# Patient Record
Sex: FEMALE | Race: OTHER | HISPANIC OR LATINO | URBAN - METROPOLITAN AREA
[De-identification: names, ages, dates, MRNs, and addresses within clinical notes are randomized per-mention and may not be internally consistent; named-entity substitution may affect disease eponyms.]

---

## 2023-03-01 ENCOUNTER — INPATIENT (INPATIENT)
Facility: HOSPITAL | Age: 88
LOS: 1 days | Discharge: HOME | DRG: 149 | End: 2023-03-03
Attending: STUDENT IN AN ORGANIZED HEALTH CARE EDUCATION/TRAINING PROGRAM | Admitting: STUDENT IN AN ORGANIZED HEALTH CARE EDUCATION/TRAINING PROGRAM
Payer: COMMERCIAL

## 2023-03-01 VITALS
HEIGHT: 60.24 IN | WEIGHT: 138.89 LBS | HEART RATE: 64 BPM | OXYGEN SATURATION: 98 % | DIASTOLIC BLOOD PRESSURE: 69 MMHG | SYSTOLIC BLOOD PRESSURE: 195 MMHG | RESPIRATION RATE: 16 BRPM | TEMPERATURE: 97 F

## 2023-03-01 DIAGNOSIS — I16.0 HYPERTENSIVE URGENCY: ICD-10-CM

## 2023-03-01 LAB
ACETONE SERPL-MCNC: NEGATIVE — SIGNIFICANT CHANGE UP
ALBUMIN SERPL ELPH-MCNC: 3.4 G/DL — LOW (ref 3.5–5)
ALP SERPL-CCNC: 70 U/L — SIGNIFICANT CHANGE UP (ref 40–120)
ALT FLD-CCNC: 18 U/L DA — SIGNIFICANT CHANGE UP (ref 10–60)
ANION GAP SERPL CALC-SCNC: 5 MMOL/L — SIGNIFICANT CHANGE UP (ref 5–17)
APTT BLD: 37.4 SEC — HIGH (ref 27.5–35.5)
AST SERPL-CCNC: 25 U/L — SIGNIFICANT CHANGE UP (ref 10–40)
BASOPHILS # BLD AUTO: 0.05 K/UL — SIGNIFICANT CHANGE UP (ref 0–0.2)
BASOPHILS NFR BLD AUTO: 1 % — SIGNIFICANT CHANGE UP (ref 0–2)
BILIRUB SERPL-MCNC: 0.6 MG/DL — SIGNIFICANT CHANGE UP (ref 0.2–1.2)
BUN SERPL-MCNC: 36 MG/DL — HIGH (ref 7–18)
CALCIUM SERPL-MCNC: 10.1 MG/DL — SIGNIFICANT CHANGE UP (ref 8.4–10.5)
CHLORIDE SERPL-SCNC: 109 MMOL/L — HIGH (ref 96–108)
CK SERPL-CCNC: 150 U/L — SIGNIFICANT CHANGE UP (ref 21–215)
CO2 SERPL-SCNC: 25 MMOL/L — SIGNIFICANT CHANGE UP (ref 22–31)
CREAT SERPL-MCNC: 1.49 MG/DL — HIGH (ref 0.5–1.3)
EGFR: 32 ML/MIN/1.73M2 — LOW
EOSINOPHIL # BLD AUTO: 0.03 K/UL — SIGNIFICANT CHANGE UP (ref 0–0.5)
EOSINOPHIL NFR BLD AUTO: 0.6 % — SIGNIFICANT CHANGE UP (ref 0–6)
GLUCOSE SERPL-MCNC: 137 MG/DL — HIGH (ref 70–99)
HCT VFR BLD CALC: 33.5 % — LOW (ref 34.5–45)
HGB BLD-MCNC: 11.2 G/DL — LOW (ref 11.5–15.5)
IMM GRANULOCYTES NFR BLD AUTO: 0.4 % — SIGNIFICANT CHANGE UP (ref 0–0.9)
INR BLD: 1.19 RATIO — HIGH (ref 0.88–1.16)
LYMPHOCYTES # BLD AUTO: 0.79 K/UL — LOW (ref 1–3.3)
LYMPHOCYTES # BLD AUTO: 16 % — SIGNIFICANT CHANGE UP (ref 13–44)
MAGNESIUM SERPL-MCNC: 1.8 MG/DL — SIGNIFICANT CHANGE UP (ref 1.6–2.6)
MCHC RBC-ENTMCNC: 30.1 PG — SIGNIFICANT CHANGE UP (ref 27–34)
MCHC RBC-ENTMCNC: 33.4 GM/DL — SIGNIFICANT CHANGE UP (ref 32–36)
MCV RBC AUTO: 90.1 FL — SIGNIFICANT CHANGE UP (ref 80–100)
MONOCYTES # BLD AUTO: 0.28 K/UL — SIGNIFICANT CHANGE UP (ref 0–0.9)
MONOCYTES NFR BLD AUTO: 5.7 % — SIGNIFICANT CHANGE UP (ref 2–14)
NEUTROPHILS # BLD AUTO: 3.78 K/UL — SIGNIFICANT CHANGE UP (ref 1.8–7.4)
NEUTROPHILS NFR BLD AUTO: 76.3 % — SIGNIFICANT CHANGE UP (ref 43–77)
NRBC # BLD: 0 /100 WBCS — SIGNIFICANT CHANGE UP (ref 0–0)
NT-PROBNP SERPL-SCNC: 943 PG/ML — HIGH (ref 0–450)
PLATELET # BLD AUTO: 145 K/UL — LOW (ref 150–400)
POTASSIUM SERPL-MCNC: 4.1 MMOL/L — SIGNIFICANT CHANGE UP (ref 3.5–5.3)
POTASSIUM SERPL-SCNC: 4.1 MMOL/L — SIGNIFICANT CHANGE UP (ref 3.5–5.3)
PROT SERPL-MCNC: 7.2 G/DL — SIGNIFICANT CHANGE UP (ref 6–8.3)
PROTHROM AB SERPL-ACNC: 14.2 SEC — HIGH (ref 10.5–13.4)
RBC # BLD: 3.72 M/UL — LOW (ref 3.8–5.2)
RBC # FLD: 12.8 % — SIGNIFICANT CHANGE UP (ref 10.3–14.5)
SODIUM SERPL-SCNC: 139 MMOL/L — SIGNIFICANT CHANGE UP (ref 135–145)
TROPONIN I, HIGH SENSITIVITY RESULT: 14.3 NG/L — SIGNIFICANT CHANGE UP
WBC # BLD: 4.95 K/UL — SIGNIFICANT CHANGE UP (ref 3.8–10.5)
WBC # FLD AUTO: 4.95 K/UL — SIGNIFICANT CHANGE UP (ref 3.8–10.5)

## 2023-03-01 PROCEDURE — 70450 CT HEAD/BRAIN W/O DYE: CPT | Mod: 26,MA

## 2023-03-01 PROCEDURE — 99285 EMERGENCY DEPT VISIT HI MDM: CPT

## 2023-03-01 RX ORDER — LABETALOL HCL 100 MG
100 TABLET ORAL ONCE
Refills: 0 | Status: COMPLETED | OUTPATIENT
Start: 2023-03-01 | End: 2023-03-01

## 2023-03-01 RX ORDER — SODIUM CHLORIDE 9 MG/ML
500 INJECTION INTRAMUSCULAR; INTRAVENOUS; SUBCUTANEOUS ONCE
Refills: 0 | Status: COMPLETED | OUTPATIENT
Start: 2023-03-01 | End: 2023-03-01

## 2023-03-01 RX ORDER — SODIUM CHLORIDE 9 MG/ML
1000 INJECTION INTRAMUSCULAR; INTRAVENOUS; SUBCUTANEOUS
Refills: 0 | Status: DISCONTINUED | OUTPATIENT
Start: 2023-03-01 | End: 2023-03-02

## 2023-03-01 RX ORDER — HYDRALAZINE HCL 50 MG
10 TABLET ORAL ONCE
Refills: 0 | Status: COMPLETED | OUTPATIENT
Start: 2023-03-01 | End: 2023-03-01

## 2023-03-01 RX ORDER — ASPIRIN/CALCIUM CARB/MAGNESIUM 324 MG
162 TABLET ORAL ONCE
Refills: 0 | Status: COMPLETED | OUTPATIENT
Start: 2023-03-01 | End: 2023-03-01

## 2023-03-01 RX ADMIN — SODIUM CHLORIDE 125 MILLILITER(S): 9 INJECTION INTRAMUSCULAR; INTRAVENOUS; SUBCUTANEOUS at 17:55

## 2023-03-01 RX ADMIN — Medication 100 MILLIGRAM(S): at 22:25

## 2023-03-01 RX ADMIN — Medication 10 MILLIGRAM(S): at 22:25

## 2023-03-01 RX ADMIN — SODIUM CHLORIDE 1000 MILLILITER(S): 9 INJECTION INTRAMUSCULAR; INTRAVENOUS; SUBCUTANEOUS at 23:40

## 2023-03-01 NOTE — ED PROVIDER NOTE - PATIENT PORTAL LINK FT
You can access the FollowMyHealth Patient Portal offered by [facility] by registering at the following website: [email or web address]. By joining [facility]’s FollowMyHealth portal, you will also be able to view your health information using other applications (apps) compatible with our system.

## 2023-03-01 NOTE — ED PROVIDER NOTE - PROGRESS NOTE DETAILS
lying down  HR 62, B/P 182/75  sitting up    HR 65, B/P 179/71  standing up HR 66, B/P 174/71 pt's feeling much better, Labs/CT head explained to pt & daughter in law, Pt with JANINE, will d/c home.  Advised to f/u with PCP Case d/w Dr. Hidalgo B/P 179/77, pt still c/o feeling dizzy, will give Hydralazine & reassess  B/P 111/71, pt's c/o leg shaking, not feeling better, will admit pt.

## 2023-03-01 NOTE — ED PROVIDER NOTE - NEUROLOGICAL, MLM
Alert and oriented, no focal deficits, no motor or sensory deficits.  Romberg- Alert and oriented, no focal deficits, no motor or sensory deficits.  Romberg-neg, no ataxia with roll aider

## 2023-03-01 NOTE — ED PROVIDER NOTE - NSFOLLOWUPINSTRUCTIONS_ED_ALL_ED_FT
Mareos    Dizziness      Los mareos son un problema muy frecuente. Se trata de aba sensación de inestabilidad o desvanecimiento. Puede sentir que se va a desmayar. Los mareos pueden provocarle aba lesión si se tropieza o se . Las personas de todas las edades pueden sufrir mareos, laron es más frecuente en los adultos mayores. Esta afección puede tener muchas causas, entre las que se pueden mencionar los medicamentos, la deshidratación y las enfermedades.      Siga estas instrucciones en benedict casa:      Comida y bebida   A comparison of three sample cups showing dark yellow, yellow, and pale yellow urine.   •Agnieszka suficiente líquido sophie para mantener la orina de color amarillo pálido. [place]agon [place] kota la deshidratación. Trate de beber más líquidos transparentes, sophie agua.      • No agnieszka alcohol.      •Limite el consumo de cafeína si el médico se lo indica. Verifique los ingredientes y la información nutricional para saber si un alimento o aba bebida contienen cafeína.      •Limite el consumo de sal (sodio) si el médico se lo indica. Verifique los ingredientes y la información nutricional para saber si un alimento o aba bebida contienen sodio.        Actividad   A sign showing that a person should not drive. •Evite los movimientos rápidos.  •Levántese de las minh con lentitud y apóyese hasta sentirse ariana.      •Por la mañana, siéntese lee a un lado de la cama. Cuando se sienta ariana, póngase lentamente de pie mientras se sostiene de algo, hasta que sepa que ha logrado el equilibrio.        •Mueva las piernas con frecuencia si debe estar de pie en un lugar garfield mucho tiempo. Mientras esté de pie, contraiga y relaje los músculos de las piernas.      • No conduzca vehículos ni opere maquinaria si se siente mareado.      •Evite agacharse si se siente mareado. En benedict casa, coloque los objetos de modo que le resulte fácil alcanzarlos sin agacharse.      Estilo de angelic     • No consuma ningún producto que contenga nicotina o tabaco. Estos productos incluyen cigarrillos, tabaco para mascar y aparatos de vapeo, sophie los cigarrillos electrónicos. Si necesita ayuda para dejar de fumar, consulte al médico.      •Trate de reducir el nivel de estrés con métodos sophie el yoga o la meditación. Hable con el médico si necesita ayuda para controlar el nivel de estrés.      Instrucciones generales     •Controle zahida mareos para eric si hay cambios.      •Use los medicamentos de venta hussein y los recetados solamente sophie se lo haya indicado el médico. Hable con el médico si messi que los medicamentos que está tomando son la causa de zahida mareos.      •Infórmele a un amigo o a un familiar si se siente mareado. Pídale a esta persona que llame al médico si observa cambios en benedict comportamiento.      •Concurra a todas las visitas de seguimiento. [place] es importante.        Comuníquese con un médico si:    •Los mareos no desaparecen o tiene síntomas nuevos.      •Los mareos o la sensación de desvanecimiento empeoran.      •Siente náuseas.      •Se le redujo la audición.      •Tiene fiebre.      •Dolor o rigidez en el zuleyma.      •Los mareos derivan en aba lesión o aba caída.        Solicite ayuda de inmediato si:    •Vomita o tiene diarrea y no puede comer ni beber nada.      •Tiene dificultad para hablar, caminar, tragar o usar los brazos, las tracie o las piernas.      •Se siente constantemente débil.      •Tiene cualquier tipo de sangrado.      •No piensa con claridad o tiene dificultad para armar oraciones. Es posible que un amigo o un familiar adviertan que esto ocurre.      •Tiene dolor de pecho, dolor abdominal, sudoración o le falta el aire.      •Tiene cambios en la visión o le aparece un dolor de kaylah intenso.      Estos síntomas pueden representar un problema grave que constituye aba emergencia. No espere a eric si los síntomas desaparecen. Solicite atención médica de inmediato. Comuníquese con el servicio de emergencias de benedict localidad (911 en los [place]). No conduzca por zahida propios medios hasta el hospital.       Resumen    •Los mareos son aba sensación de inestabilidad o desvanecimiento. Esta afección puede tener muchas causas, entre las que se pueden mencionar los medicamentos, la deshidratación y las enfermedades.      •Las personas de todas las edades pueden sufrir mareos, laron es más frecuente en los adultos mayores.      •Agnieszka suficiente líquido sophie para mantener la orina de color amarillo pálido. No agnieszka alcohol.      •Evite los movimientos rápidos si se siente mareado. Controle zahida mareos para eric si hay cambios.      Esta información no tiene sophie fin reemplazar el consejo del médico. Asegúrese de hacerle al médico cualquier pregunta que tenga.            Lesión renal aguda    LO QUE NECESITA SABER:    ¿Qué es aba lesión renal aguda?La lesión renal aguda (JANINE) también se llama insuficiencia renal aguda o insuficiencia de riñón aguda. La JANINE ocurre cuando los riñones de repente rachel de funcionar correctamente. Generalmente, los riñones se encargan de eliminar líquidos, químicos y desperdicios de la beatris. Los riñones convierten estos desechos en orina. La JANINE ocurre generalmente garfield horas o días. Cuando tiene JANINE, los riñones no eliminan los residuos, químicos o líquidos adicionales del cuerpo. No se produce la cantidad normal de orina. Aba JANINE en la mayoría de los casos es temporal, laron es posible que se convierta en un trastorno renal crónico.    ¿Qué causa JANINE?  •Disminución del flujo sanguíneo al riñón, sophie por hipercalcemia (nivel de calcio elevado en la beatris) o enfermedad grave de corazón      •Aba enfermedad o afección que afecta a los riñones, sophie hipertensión (presión trudi de beatris) o diabetes      •Aba obstrucción en el riñón o el uréter, sophie aba faye de riñón o vejiga, agrandamiento de la próstata o tumor      ¿Qué aumenta mi riesgo de padecer JANINE?  •Ser hospitalizado con aba enfermedad grave, bertha sophie sepsis o quemaduras graves      •Enfermedad arterial periférica      •Edad avanzada en adultos      •Problemas de riñón o hígado      •Afecciones médicas tales sophie deshidratación, hipertensión, diabetes o insuficiencia cardíaca      •Ciertos medicamentos sophie los TRACI      ¿Cuáles son los signos y síntomas de la JANINE?Puede que no tenga ningún síntoma si tiene JANINE inicial o leve. A medida que progresa la JANINE, puede que sufra alguno de los siguientes:   •Disminución en la cantidad de orina o ninguna micción      •Inflamación en los brazos, piernas o pies      •Debilidad, somnolencia o falta de apetito      •Náuseas, dolor en el costado, contorsiones musculares o calambres musculares      •Picazón en la piel, o el aliento o el cuerpo huelen a orina      •Cambios de comportamiento, confusión, desorientación o convulsiones      ¿Cómo se diagnostica la JANINE?Hay muchas causas de la JANINE. Para encontrar la causa y tratar correctamente benedict JANINE, benedict médico puede hacer cualquiera de las siguientes:   •Exámenes de beatris y orinamuestran lo ariana que funcionan zahida riñones. También puede mostrar la causa de benedict JANINE.      •Aba radiografía o un ultrasonidopuede mostrar si hay problemas en los riñones. Benedict médico puede eric aba obstrucción en los riñones. Puede eric el estrechamiento de la arteria que envía beatris a los riñones. Es probable que usted reciba un líquido de contraste para que zahida riñones se aprecien mejor en las imágenes. Dígale al médico si usted alguna vez ha tenido aba reacción alérgica al líquido de contraste.      ¿Cómo se trata la JANINE?El tratamiento depende de la causa de benedict lesión renal aguda y la gravedad de la misma. Generalmente, se monitoreará la JANINE en el hospital. Si tiene JANINE leve, quizás pueda volver a benedict casa para recuperarse. Benedict médico tratará la causa de benedict JANINE. Puede necesitar líquidos intravenosos si el JANINE fue causado por la escasez o ausencia de líquido en el cuerpo. Puede que necesite diálisis para extraer el líquido adicional y los desechos de benedict cuerpo. Benedict médico podría indicarle que consuma alimentos bajos en sodio (sal), potasio, fósforo o proteína. Puede que necesite eric a un dietista antes de que sea dado de trudi para obtener ayuda con la planificación de zahida comidas.    ¿Cómo puedo prevenir la JANINE?  •Controle otras afecciones de saludcomo la diabetes, hipertensión o enfermedades cardíacas. Estos trastornos aumentan benedict riesgo de lesión renal aguda. [place] el medicamento para benedict afección sophie se le indique. Revise benedict presión arterial y niveles de azúcar en la beatris sophie se le indicó. Póngase en contacto con benedict médico si zahida niveles no están en el rango que le dijo que debe ser.      •Hable con benedict médico antes de shima medicamentos sin receta médica.Los medicamentos antiinflamatorios no esteroides o AINES, los medicamentos para el estómago o los laxantes podrían ser nocivos para zahida riñones y aumentar el riesgo de aba lesión renal aguda. Si está ariana shima el medicamento, siga las instrucciones en el paquete. No tome más medicamento de lo indicado.      •Diga a los médicos si usted ha tenido JANINE anteriormenteantes de recibir un líquido de contraste para radiografías o aba tomografía computarizada. Benedict médico podría darle medicamento para prevenir problemas renales causados por el líquido.      ACUERDOS SOBRE BENEDICT CUIDADO:    Usted tiene el derecho de ayudar a planear benedict cuidado. Aprenda todo lo que pueda sobre benedict condición y sophie darle tratamiento. Discuta zahida opciones de tratamiento con zahida médicos para decidir el cuidado que usted desea recibir. Usted siempre tiene el derecho de rechazar el tratamiento.       low salt diet        Cómo controlar benedict hipertensión    Managing Your Hypertension      La hipertensión, también conocida sophie presión arterial trudi, se produce cuando la beatris ejerce presión contra las fallon de las arterias con demasiada fuerza. Las arterias son los vasos sanguíneos que transportan la beatris desde el corazón hacia todas las partes del cuerpo. La hipertensión hace que el corazón chucky más esfuerzo para bombear la beatris y puede provocar que las arterias se estrechen o endurezcan.      Qué significan las lecturas de la presión arterial    Aba lectura de la presión arterial consta de un número más alto sobre un número más bajo.  •El primer número, o número superior, es la presión sistólica. Es la medida de la presión de las arterias cuando el corazón late.      •El dayday número, o número inferior, es la presión diastólica. Es la medida de la presión en las arterias cuando el corazón se relaja.      Para la mayoría de las personas, aba presión arterial normal está por debajo de 120/80. La presión arterial deseada puede variar en función de las enfermedades, la edad y otros factores personales.    La presión arterial se clasifica en cuatro etapas. Sobre la base de la lectura de benedict presión arterial, el médico puede usar las siguientes etapas para determinar si necesita tratamiento y de qué tipo. La presión sistólica y la presión diastólica se miden en aba unidad llamada milímetros de jenni (mm Hg).    Normal    •Presión sistólica: por debajo de 120.      •Presión diastólica: por debajo de 80.      Elevada    •Presión sistólica: 120–129.      •Presión diastólica: por debajo de 80.      Etapa 1 de hipertensión    •Presión sistólica: 130–139.      •Presión diastólica: 80–89.      Etapa 2 de hipertensión    •Presión sistólica: 140 o más.      •Presión diastólica: 90 o más.        ¿Cómo puede afectarme esta enfermedad?    Controlar la hipertensión es muy importante. Con el transcurso del tiempo, la hipertensión puede dañar las arterias y disminuir el flujo de beatris hacia partes del cuerpo que incluyen el cerebro, el corazón y los riñones. Tener hipertensión no tratada o no controlada puede causar:  •Infarto de miocardio.      •Accidente cerebrovascular.      •Debilitamiento de los vasos sanguíneos (aneurisma).      •Insuficiencia cardíaca.      •Daño renal.      •Daño ocular.      •Problemas de memoria y concentración.      •Demencia vascular.        ¿Qué medidas puedo shima para controlar esta afección?    La hipertensión se puede controlar haciendo cambios en el estilo de angelic y, posiblemente, tomando medicamentos. Benedict médico le ayudará a crear un plan para bajar la presión arterial al rango normal. Es posible que lo deriven para que reciba asesoramiento sobre aba dieta saludable y actividad física.      Nutrición   A plate with examples of foods in a healthy diet. •Siga aba dieta con alto contenido de fibras y potasio, y con bajo contenido de sal (sodio), azúcar agregada y grasas. Un ejemplo de plan de alimentación se denomina dieta DASH. DASH es la sigla en inglés de “Enfoques alimentarios para detener la hipertensión”. Para alimentarse de esta manera:  •Coma mucha fruta y verdura fresca. Trate de que la mitad del plato de cada comida sea de frutas y verduras.      •Coma cereales integrales, sophie pasta integral, arroz integral o pan integral. Llene aproximadamente un cuarto del plato con cereales integrales.      •Consuma productos lácteos descremados.      •Evite la ingesta de whyte de carne grasa, carne procesada o curada, y carne de ave con piel. Llene aproximadamente un cuarto del plato con proteínas magras, sophie pescado, claudette sin piel, frijoles, huevos o tofu.      •Evite ingerir alimentos prehechos y procesados. En general, estos tienen mayor cantidad de sodio, azúcar agregada y grasa.        •Reduzca benedict ingesta diaria de sodio. Muchas personas que tienen hipertensión deben comer menos de 1500 mg de sodio por día.        Estilo de angelic   A person riding a bicycle wearing a safety helmet.   •Trabaje con benedict médico para mantener un peso saludable o perder peso. Pregúntele cuál es el peso recomendado para usted.      •Realice al menos 30 minutos de ejercicio que chucky que se acelere benedict corazón (ejercicio aeróbico) la mayoría de los días de la semana. Estas actividades pueden incluir caminar, nadar o andar en bicicleta.      •Incluya ejercicios para fortalecer zahida músculos (ejercicios de resistencia), sophie levantamiento de pesas, sophie parte de benedict rutina semanal de ejercicios. Intente realizar 30 minutos de cecy tipo de ejercicios al menos alla días a la semana.      • No consuma ningún producto que contenga nicotina o tabaco. Estos productos incluyen cigarrillos, tabaco para mascar y aparatos de vapeo, sophie los cigarrillos electrónicos. Si necesita ayuda para dejar de consumir estos productos, consulte al médico.      •Controle las enfermedades a jennifer plazo (crónicas), sophie el colesterol alto o la diabetes.      •Identifique zahida causas de estrés y encuentre maneras de controlar el estrés. [place] puede incluir meditación, respiración profunda o hacerse tiempo para realizar actividades divertidas.      Consumo de alcohol   • No agnieszka alcohol si:  •Benedict médico le indica no hacerlo.      •Está embarazada, puede estar embarazada o está tratando de quedar embarazada.      •Si precious alcohol:•Limite la cantidad que precious a lo siguiente:  •De 0 a 1 medida por día para las mujeres.      •De 0 a 2 medidas por día para los hombres.        •Sepa cuánta cantidad de alcohol hay en las bebidas que kerry. En los [place], aba medida equivale a aba botella de cerveza de 12 oz (355 ml), un vaso de vino de 5 oz (148 ml) o un vaso de aba bebida alcohólica de trudi graduación de 1½ oz (44 ml).        Medicamentos     El médico puede recetarle medicamentos si los cambios en el estilo de angelic no son suficientes para lograr controlar la presión arterial y si:  •Benedict presión arterial sistólica es de 130 o más.      •Benedict presión arterial diastólica es de 80 o más.      Use los medicamentos solamente sophie se lo haya indicado el médico. Siga cuidadosamente las indicaciones. Los medicamentos para la presión arterial deben tomarse sophie se lo haya indicado el médico. Los medicamentos pierden eficacia al omitir las dosis. El hecho de omitir las dosis también aumenta el riesgo de otros problemas.      Monitoreo  A person checking his or her blood pressure.   Antes de controlarse la presión arterial:  •No fume, no consuma bebidas con cafeína ni chucky ejercicio dentro de los 30 minutos antes de shima la medición.      •Vaya al baño y vacíe la vejiga (orine).      •Permanezca sentado tranquilamente garfield al menos 5 minutos antes de shima las mediciones.      Contrólese la presión arterial en benedict casa según las indicaciones del médico. Para hacer esto:  •Siéntese con la espalda recta y con apoyo.      •Coloque los pies planos en el piso. No se cruce de piernas.      •Apoye el brazo sobre aba superficie plana, sophie aba carrero. Asegúrese de que la parte superior del brazo esté al nivel del corazón.      •Cada vez que tome aba medición, tome dos o alla lecturas con un minuto de separación y anote los resultados.      Posiblemente también sea necesario que el médico le controle la presión arterial de manera regular.    Información general    •Hable con benedict médico acerca de la dieta, hábitos de ejercicio y otros factores del estilo de angelic que pueden contribuir a la hipertensión.      •Revise con benedict médico todos los medicamentos que kerry ya que puede kirit efectos secundarios o interacciones.      •Concurra a todas las visitas de seguimiento. El médico puede ayudarle a crear y ajustar benedict plan para controlar la presión arterial trudi.        Dónde obtener más información    •National Heart, Lung, and Blood [place] (Instituto Nacional del Coraz[place], los Pulmones y la Beatris): www.nhlbi.nih.gov      •American Heart Association (Asociación Estadounidense del Coraz[place]): www.heart.org        Comuníquese con un médico si:    •Piensa que tiene aba reacción alérgica a los medicamentos que ha tomado.      •Tiene gertrude de kaylah frecuentes (recurrentes).      •Siente mareos.      •Tiene hinchazón en los tobillos.      •Tiene problemas de visión.        Solicite ayuda de inmediato si:    •Siente un dolor de kaylah intenso o confusión.      •Siente debilidad inusual, adormecimiento o que se desmayará.      •Siente un dolor intenso en el pecho o el abdomen.      •Vomita repetidas veces.      •Tiene dificultad para respirar.      Estos síntomas pueden indicar aba emergencia. Solicite ayuda de inmediato. Llame al 911.   • No espere a eric si los síntomas desaparecen.       • No conduzca por zahida propios medios hasta el hospital.         Resumen    •La hipertensión se produce cuando la beatris bombea en las arterias con mucha fuerza. Si esta afección no se controla, podría correr riesgo de tener complicaciones graves.      •La presión arterial deseada puede variar en función de las enfermedades, la edad y otros factores personales. Para la mayoría de las personas, aba presión arterial normal es uriah que 120/80.      •La hipertensión se puede controlar mediante cambios en el estilo de angelic, tomando medicamentos, o ambas cosas.      •Los cambios en el estilo de angelic para ayudar a controlar la hipertensión incluyen pérdida de peso, seguir aba dieta saludable, con bajo contenido de sodio, hacer más ejercicio, dejar de fumar y limitar el consumo de alcohol.      Esta información no tiene sophie fin reemplazar el consejo del médico. Asegúrese de hacerle al médico cualquier pregunta que tenga.

## 2023-03-01 NOTE — ED PROVIDER NOTE - CLINICAL SUMMARY MEDICAL DECISION MAKING FREE TEXT BOX
pt c/o feeling dizzy, no vertigo, will get orthostatic, labs., CT head.  Since pt's on HCTZ, concern for metabolic imbalance.

## 2023-03-01 NOTE — ED PROVIDER NOTE - CARE PLAN
Principal Discharge DX:	Dizziness  Secondary Diagnosis:	JANINE (acute kidney injury)   1 Principal Discharge DX:	Hypertensive urgency  Secondary Diagnosis:	JANINE (acute kidney injury)  Secondary Diagnosis:	Dizziness

## 2023-03-01 NOTE — ED PROVIDER NOTE - OBJECTIVE STATEMENT
89 y.o. female as per daughter, pt c/o feeling dizzy since waking up this am, no vertigo, LOC, CP, n/v, fever.  Pt went to  & sent to ED.

## 2023-03-02 DIAGNOSIS — I16.1 HYPERTENSIVE EMERGENCY: ICD-10-CM

## 2023-03-02 DIAGNOSIS — D32.9 BENIGN NEOPLASM OF MENINGES, UNSPECIFIED: ICD-10-CM

## 2023-03-02 DIAGNOSIS — E11.9 TYPE 2 DIABETES MELLITUS WITHOUT COMPLICATIONS: ICD-10-CM

## 2023-03-02 DIAGNOSIS — N17.9 ACUTE KIDNEY FAILURE, UNSPECIFIED: ICD-10-CM

## 2023-03-02 DIAGNOSIS — R73.03 PREDIABETES: ICD-10-CM

## 2023-03-02 DIAGNOSIS — Z29.9 ENCOUNTER FOR PROPHYLACTIC MEASURES, UNSPECIFIED: ICD-10-CM

## 2023-03-02 DIAGNOSIS — R42 DIZZINESS AND GIDDINESS: ICD-10-CM

## 2023-03-02 DIAGNOSIS — R06.09 OTHER FORMS OF DYSPNEA: ICD-10-CM

## 2023-03-02 DIAGNOSIS — Z98.49 CATARACT EXTRACTION STATUS, UNSPECIFIED EYE: Chronic | ICD-10-CM

## 2023-03-02 LAB
A1C WITH ESTIMATED AVERAGE GLUCOSE RESULT: 5.8 % — HIGH (ref 4–5.6)
ALBUMIN SERPL ELPH-MCNC: 2.9 G/DL — LOW (ref 3.5–5)
ALP SERPL-CCNC: 65 U/L — SIGNIFICANT CHANGE UP (ref 40–120)
ALT FLD-CCNC: 16 U/L DA — SIGNIFICANT CHANGE UP (ref 10–60)
ANION GAP SERPL CALC-SCNC: 6 MMOL/L — SIGNIFICANT CHANGE UP (ref 5–17)
AST SERPL-CCNC: 20 U/L — SIGNIFICANT CHANGE UP (ref 10–40)
BASOPHILS # BLD AUTO: 0.04 K/UL — SIGNIFICANT CHANGE UP (ref 0–0.2)
BASOPHILS NFR BLD AUTO: 0.7 % — SIGNIFICANT CHANGE UP (ref 0–2)
BILIRUB SERPL-MCNC: 0.5 MG/DL — SIGNIFICANT CHANGE UP (ref 0.2–1.2)
BUN SERPL-MCNC: 29 MG/DL — HIGH (ref 7–18)
CALCIUM SERPL-MCNC: 8.6 MG/DL — SIGNIFICANT CHANGE UP (ref 8.4–10.5)
CHLORIDE SERPL-SCNC: 114 MMOL/L — HIGH (ref 96–108)
CHOLEST SERPL-MCNC: 169 MG/DL — SIGNIFICANT CHANGE UP
CO2 SERPL-SCNC: 23 MMOL/L — SIGNIFICANT CHANGE UP (ref 22–31)
CREAT SERPL-MCNC: 1.37 MG/DL — HIGH (ref 0.5–1.3)
EGFR: 35 ML/MIN/1.73M2 — LOW
EOSINOPHIL # BLD AUTO: 0.27 K/UL — SIGNIFICANT CHANGE UP (ref 0–0.5)
EOSINOPHIL NFR BLD AUTO: 4.4 % — SIGNIFICANT CHANGE UP (ref 0–6)
ESTIMATED AVERAGE GLUCOSE: 120 MG/DL — HIGH (ref 68–114)
GLUCOSE BLDC GLUCOMTR-MCNC: 102 MG/DL — HIGH (ref 70–99)
GLUCOSE BLDC GLUCOMTR-MCNC: 104 MG/DL — HIGH (ref 70–99)
GLUCOSE BLDC GLUCOMTR-MCNC: 115 MG/DL — HIGH (ref 70–99)
GLUCOSE BLDC GLUCOMTR-MCNC: 124 MG/DL — HIGH (ref 70–99)
GLUCOSE SERPL-MCNC: 109 MG/DL — HIGH (ref 70–99)
HCT VFR BLD CALC: 31.1 % — LOW (ref 34.5–45)
HDLC SERPL-MCNC: 41 MG/DL — LOW
HGB BLD-MCNC: 10.2 G/DL — LOW (ref 11.5–15.5)
IMM GRANULOCYTES NFR BLD AUTO: 0.5 % — SIGNIFICANT CHANGE UP (ref 0–0.9)
LIPID PNL WITH DIRECT LDL SERPL: 93 MG/DL — SIGNIFICANT CHANGE UP
LYMPHOCYTES # BLD AUTO: 1.1 K/UL — SIGNIFICANT CHANGE UP (ref 1–3.3)
LYMPHOCYTES # BLD AUTO: 17.9 % — SIGNIFICANT CHANGE UP (ref 13–44)
MAGNESIUM SERPL-MCNC: 1.7 MG/DL — SIGNIFICANT CHANGE UP (ref 1.6–2.6)
MCHC RBC-ENTMCNC: 30.2 PG — SIGNIFICANT CHANGE UP (ref 27–34)
MCHC RBC-ENTMCNC: 32.8 GM/DL — SIGNIFICANT CHANGE UP (ref 32–36)
MCV RBC AUTO: 92 FL — SIGNIFICANT CHANGE UP (ref 80–100)
MONOCYTES # BLD AUTO: 0.59 K/UL — SIGNIFICANT CHANGE UP (ref 0–0.9)
MONOCYTES NFR BLD AUTO: 9.6 % — SIGNIFICANT CHANGE UP (ref 2–14)
NEUTROPHILS # BLD AUTO: 4.11 K/UL — SIGNIFICANT CHANGE UP (ref 1.8–7.4)
NEUTROPHILS NFR BLD AUTO: 66.9 % — SIGNIFICANT CHANGE UP (ref 43–77)
NON HDL CHOLESTEROL: 128 MG/DL — SIGNIFICANT CHANGE UP
NRBC # BLD: 0 /100 WBCS — SIGNIFICANT CHANGE UP (ref 0–0)
PHOSPHATE SERPL-MCNC: 2.7 MG/DL — SIGNIFICANT CHANGE UP (ref 2.5–4.5)
PLATELET # BLD AUTO: 136 K/UL — LOW (ref 150–400)
POTASSIUM SERPL-MCNC: 3.5 MMOL/L — SIGNIFICANT CHANGE UP (ref 3.5–5.3)
POTASSIUM SERPL-SCNC: 3.5 MMOL/L — SIGNIFICANT CHANGE UP (ref 3.5–5.3)
PROT SERPL-MCNC: 6.2 G/DL — SIGNIFICANT CHANGE UP (ref 6–8.3)
RBC # BLD: 3.38 M/UL — LOW (ref 3.8–5.2)
RBC # FLD: 13.1 % — SIGNIFICANT CHANGE UP (ref 10.3–14.5)
SARS-COV-2 RNA SPEC QL NAA+PROBE: SIGNIFICANT CHANGE UP
SODIUM SERPL-SCNC: 143 MMOL/L — SIGNIFICANT CHANGE UP (ref 135–145)
TRIGL SERPL-MCNC: 177 MG/DL — HIGH
TSH SERPL-MCNC: 4.07 UU/ML — SIGNIFICANT CHANGE UP (ref 0.34–4.82)
WBC # BLD: 6.14 K/UL — SIGNIFICANT CHANGE UP (ref 3.8–10.5)
WBC # FLD AUTO: 6.14 K/UL — SIGNIFICANT CHANGE UP (ref 3.8–10.5)

## 2023-03-02 PROCEDURE — 99291 CRITICAL CARE FIRST HOUR: CPT

## 2023-03-02 PROCEDURE — 99232 SBSQ HOSP IP/OBS MODERATE 35: CPT

## 2023-03-02 PROCEDURE — 99223 1ST HOSP IP/OBS HIGH 75: CPT | Mod: GC

## 2023-03-02 PROCEDURE — 71045 X-RAY EXAM CHEST 1 VIEW: CPT | Mod: 26

## 2023-03-02 PROCEDURE — 99223 1ST HOSP IP/OBS HIGH 75: CPT

## 2023-03-02 RX ORDER — SODIUM CHLORIDE 9 MG/ML
1000 INJECTION INTRAMUSCULAR; INTRAVENOUS; SUBCUTANEOUS
Refills: 0 | Status: COMPLETED | OUTPATIENT
Start: 2023-03-02 | End: 2023-03-02

## 2023-03-02 RX ORDER — OMEPRAZOLE 10 MG/1
1 CAPSULE, DELAYED RELEASE ORAL
Qty: 0 | Refills: 0 | DISCHARGE

## 2023-03-02 RX ORDER — HYDROCHLOROTHIAZIDE 25 MG
1 TABLET ORAL
Qty: 0 | Refills: 0 | DISCHARGE

## 2023-03-02 RX ORDER — INSULIN LISPRO 100/ML
VIAL (ML) SUBCUTANEOUS EVERY 4 HOURS
Refills: 0 | Status: DISCONTINUED | OUTPATIENT
Start: 2023-03-02 | End: 2023-03-03

## 2023-03-02 RX ORDER — MECLIZINE HCL 12.5 MG
12.5 TABLET ORAL THREE TIMES A DAY
Refills: 0 | Status: DISCONTINUED | OUTPATIENT
Start: 2023-03-02 | End: 2023-03-03

## 2023-03-02 RX ORDER — CALCIUM CARBONATE 500(1250)
1 TABLET ORAL
Qty: 0 | Refills: 0 | DISCHARGE

## 2023-03-02 RX ORDER — LISINOPRIL 2.5 MG/1
1 TABLET ORAL
Qty: 0 | Refills: 0 | DISCHARGE

## 2023-03-02 RX ORDER — LABETALOL HCL 100 MG
1 TABLET ORAL
Qty: 0 | Refills: 0 | DISCHARGE

## 2023-03-02 RX ORDER — ATORVASTATIN CALCIUM 80 MG/1
40 TABLET, FILM COATED ORAL AT BEDTIME
Refills: 0 | Status: DISCONTINUED | OUTPATIENT
Start: 2023-03-02 | End: 2023-03-03

## 2023-03-02 RX ORDER — LORATADINE 10 MG/1
1 TABLET ORAL
Qty: 0 | Refills: 0 | DISCHARGE

## 2023-03-02 RX ORDER — FOSINOPRIL SODIUM 10 MG/1
1 TABLET ORAL
Qty: 0 | Refills: 0 | DISCHARGE

## 2023-03-02 RX ORDER — ASPIRIN/CALCIUM CARB/MAGNESIUM 324 MG
81 TABLET ORAL DAILY
Refills: 0 | Status: DISCONTINUED | OUTPATIENT
Start: 2023-03-02 | End: 2023-03-03

## 2023-03-02 RX ADMIN — Medication 162 MILLIGRAM(S): at 00:07

## 2023-03-02 RX ADMIN — ATORVASTATIN CALCIUM 40 MILLIGRAM(S): 80 TABLET, FILM COATED ORAL at 23:20

## 2023-03-02 RX ADMIN — Medication 81 MILLIGRAM(S): at 12:20

## 2023-03-02 RX ADMIN — SODIUM CHLORIDE 60 MILLILITER(S): 9 INJECTION INTRAMUSCULAR; INTRAVENOUS; SUBCUTANEOUS at 12:21

## 2023-03-02 NOTE — H&P ADULT - PROBLEM SELECTOR PLAN 3
p/w SCr of 1.49, unknown baseline  likely pre-renal BUN/Cr ratio of 24  Monitor SCr in the setting of overcorrection of BP   IVF NS 60 ml/12hrs

## 2023-03-02 NOTE — CONSULT NOTE ADULT - TIME BILLING
I counseled the patient and primary team about the further testing indicated to help determine the etiology of the patient's dizziness.

## 2023-03-02 NOTE — H&P ADULT - NSHPPHYSICALEXAM_GEN_ALL_CORE
GENERAL: NAD, lying in bed, looks anxious   HEAD:  Atraumatic, Normocephalic  EYES: EOMI, PERRLA, conjunctiva and sclera clear  NECK: Supple, No JVD  CHEST/LUNG: Clear to auscultation bilaterally; No rales, rhonchi, wheezing, or rubs. Unlabored respirations  HEART: Regular rate and rhythm; No murmurs  ABDOMEN: Bowel sounds present; Soft, Nontender, Nondistended.  EXTREMITIES:  2+ Peripheral Pulses, brisk capillary refill. No clubbing, cyanosis, or edema  NERVOUS SYSTEM:  Alert & Oriented X3, speech clear.  5/5 UE and LE strength, Cranial nerve II - XII and limited cerebellar examination normal   MSK: FROM all 4 extremities, full and equal strength  SKIN: No rashes or lesions

## 2023-03-02 NOTE — PROGRESS NOTE ADULT - PROBLEM SELECTOR PLAN 2
BP of 195/ 69, on admission   s/p hydralazine and labetalol   hold labetalol in setting of hypotension and vertigo   resume hctz   Trops neg, no chest pain   BNP elevated, echo pending   Monitor BP

## 2023-03-02 NOTE — H&P ADULT - CONVERSATION DETAILS
Discussed with patient regarding the resuscitation  measures in case deemed necessary. Pt reported that she would like to have resuscitation and intubation performed if that would prolong her life.

## 2023-03-02 NOTE — PHARMACOTHERAPY INTERVENTION NOTE - COMMENTS
Medication reconciliation done with patient/family and her primary [phone] and the outside medication record was updated.

## 2023-03-02 NOTE — PATIENT PROFILE ADULT - NSPROGENBLOODRESTRICT_GEN_A_NUR
Chief Complaints and History of Present Illnesses   Patient presents with     Consult For     possible Tarso removed.      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 This lens has tremendously improved his ocular surface symptoms. Now his lower eyelid position and lateral canthal closure obstruct his vision, and are causing him difficulty getting the scleral lens in.     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 Mechanical lagophthalmos, left - Left Eye    3. Mechanical ectropion of left lower eyelid       Discussed options.  Plan in clinic left lower eyelid ectropion repair and sever tarsorrhaphy.          Attending Physician Attestation:  Complete documentation of historical and exam elements from today's encounter can be found in the full encounter summary report (not reduplicated in this progress note).  I personally obtained the chief complaint(s) and history of present illness.  I confirmed and edited as necessary the review of systems, past medical/surgical history, family history, social history, and examination findings as documented by others; and I examined the patient myself.  I personally reviewed the relevant tests, images, and reports as documented above.  I formulated and edited as necessary the assessment and plan and discussed the findings and management plan with the patient and family. - Ale Vergara MD        
none

## 2023-03-02 NOTE — CONSULT NOTE ADULT - SUBJECTIVE AND OBJECTIVE BOX
NEUROLOGY CONSULT NOTE    NAME:  GENE RANDLE    ASSESSMENT:  89 year old F with PMH of HTN, Pre-DM presents with acute onset dizziness since last night admitted for rule out CVA. May be secondary to vertigo vs posterior circulation stroke. NIHSS 0    RECOMMENDATIONS:    1. Stroke workup  - CT Angiogram Head & Neck  - MRI Brain & IAC with and without contrast to rule out posterior circulation stroke  - Carotid ultrasound  - Transthoracic Echocardiogram  - Telemetry monitoring while inpatient  - Hemoglobin A1c 5.8  - Fasting lipid panel, LDL 93    2. Secondary stroke prevention  - Q4H Neurochecks & Vital signs  - Bedside swallow evaluation before administering any PO meds  - Aspirin 81mg PO Daily (or Aspirin 300mg MS daily if NPO)  - Clopidogrel 75mg PO Daily x 21 days  - Atorvastatin 40mg PO QHS (goal LDL < 70 mg/dL)  - Treat BP if over 180/110 within first 24 hours of last seen normal; thereafter treat if over 140/90 (goal /80)  - Diabetes management as per primary team  - PT/OT  - DVT ppx: SCDs, Enoxaparin or Heparin    NOTE TO BE COMPLETED - PLEASE REFER TO ABOVE ONLY AND IGNORE INFORMATION BELOW  *******************************    CHIEF COMPLAINT:  Patient is a 89y old  Female who presents with a chief complaint of dizziness (02 Mar 2023 03:01)    HPI:  89 yrs old female from home, AAOx3 at baseline, ambulating with walker with pmhx of HTN, prediabetes presented with dizziness of one day duration. Hx taken from both patient and daughter in-law present at bedside. Pt reported of dizziness upon waking up in the morning, described mainly as being out of balance. She denied any fall, LOC, seizure, headache, chest pain, nausea/vomiting, however  had bilateral lower extremity weakness (both legs felt sleepy). Pt denied any sensory or motor deficit, slurred speech or facial droop. she reported of having shortness of breath which started in the past couple of weeks mainly on exertion without any chest pain. Pt denied any other symptoms such as abdominal pain, diarrhea, constipation, urinary symptoms. Pt lives with her grand-daughter, ambulates using walker for the past year, walks mainly at home, Pt has lost one of her daughters in the past few months due to cancer.  (02 Mar 2023 03:01)    NEURO HPI:  Patient seen with granddaughter at bedside. Reports that she has been having head spinning associated with dizziness since last night. Reports leg cramping which was limited to last night and now resolved. Also mentions history of neuropathy and tried gabapentin but had dizziness in the past. She denies lightheadedness, any headache, focal weakness, slurred speech, facial droop.    PAST MEDICAL & SURGICAL HISTORY:  Prediabetes    HTN (hypertension)    History of cataract surgery    MEDICATIONS:  aspirin  chewable 81 milliGRAM(s) Oral daily  atorvastatin 40 milliGRAM(s) Oral at bedtime  insulin lispro (ADMELOG) corrective regimen sliding scale   SubCutaneous every 4 hours  sodium chloride 0.9%. 1000 milliLiter(s) IV Continuous <Continuous>    ALLERGIES:  No Known Allergies    FAMILY HISTORY:  FH: breast cancer (Child)  FH: brain cancer (Child)      SOCIAL HISTORY:  Denies alcohol, tobacco, or illicit drug use    REVIEW OF SYSTEMS:  GENERAL: No fever, weight changes, fatigue  EYES: No eye pain or discharge  EAR/NOSE/MOUTH/THROAT: No sinus or throat pain; No difficulty hearing  NECK: No pain or stiffness  RESPIRATORY: No cough, wheezing, chills, or hemoptysis  CARDIOVASCULAR: No chest pain, palpitations, shortness of breath, or dyspnea on exertion  GASTROINTESTINAL: No abdominal pain, nausea, vomiting, hematemesis, diarrhea, or constipation  GENITOURINARY: No dysuria, frequency, hematuria, or incontinence  SKIN: No rashes or lesions  ENDOCRINE: No heat or cold intolerance  HEMATOLOGIC: No easy bruising or bleeding  PSYCHIATRIC: No depression, anxiety, or mood swings  MUSCULOSKELETAL: No joint pain or swelling  NEUROLOGICAL: As per HPI    OBJECTIVE:    Vital Signs Last 24 Hrs  T(C): 36.9 (02 Mar 2023 11:00), Max: 36.9 (02 Mar 2023 11:00)  T(F): 98.5 (02 Mar 2023 11:00), Max: 98.5 (02 Mar 2023 11:00)  HR: 64 (02 Mar 2023 11:00) (63 - 75)  BP: 132/66 (02 Mar 2023 11:00) (103/64 - 195/69)  BP(mean): --  RR: 18 (02 Mar 2023 11:00) (16 - 18)  SpO2: 97% (02 Mar 2023 11:00) (96% - 98%)    Parameters below as of 02 Mar 2023 11:00  Patient On (Oxygen Delivery Method): room air      General Examination:  General: No acute distress  HEENT: Atraumatic, Normocephalic  Respiratory: CTA B/l.  No crackles, rhonchi, or wheezes.  Cardiovascular: RRR.  Normal S1 & S2.  Normal b/l radial and pedal pulses.    Neurological Examination:  General / Mental Status: AAO x 3.  No aphasia or dysarthria.  Naming and repetition intact.  Cranial Nerves: VFF x 4.  PERRL.  EOMI x 2, No nystagmus or diplopia.  B/l V1-V3 equal and intact to light touch and pinprick.  Symmetric facial movement and palate elevation.  B/l hearing equal to finger rub.  5/5 strength with b/l sternocleidomastoid & trapezius.  Midline tongue protrusion, with no atrophy or fasciculations.  Motor: Normal bulk & tone in all four extremities.  5/5 strength throughout all four extremities.  No downward drift, rigidity, spasticity, or tremors in any of the four extremities.  Sensory: Intact to light touch and pinprick in all four extremities.  Negative Romberg.  Reflex: 2+ and symmetric at b/l biceps, triceps, brachioradialis, patellae, and ankles.  Downgoing toes b/l.  Coordination: No dysmetria with b/l finger-to-nose and heel raise tests.  Symmetric rapid alternating movements b/l.  Gait: Normal, narrow-based gait.  No difficulty with tiptoe, heel, and tandem gaits.  NIHSS Score:    LOC - 0  LOC Questions - 0  LOC Commands - 0  Gaze Preference - 0  Visual Fields - 0  Facial Palsy - 0  Motor Arm Left - 0  Motor Arm Right - 0  Motor Leg Left - 0  Motor Leg Right - 0  Limb Ataxia - 0  Sensory - 0  Language - 0  Speech - 0  Extinction - 0    NIHSS Score Total: 0    Modified [place] Scale: 1    LABORATORY VALUES:                        10.2   6.14  )-----------( 136      ( 02 Mar 2023 04:50 )             31.1       03-02    143  |  114<H>  |  29<H>  ----------------------------<  109<H>  3.5   |  23  |  1.37<H>    Ca    8.6      02 Mar 2023 04:50  Phos  2.7     03-02  Mg     1.7     03-02    TPro  6.2  /  Alb  2.9<L>  /  TBili  0.5  /  DBili  x   /  AST  20  /  ALT  16  /  AlkPhos  65  03-02    LIVER FUNCTIONS - ( 02 Mar 2023 04:50 )  Alb: 2.9 g/dL / Pro: 6.2 g/dL / ALK PHOS: 65 U/L / ALT: 16 U/L DA / AST: 20 U/L / GGT: x           03-02 Chol 169 LDL -- HDL 41<L> Trig 177<H>        NEUROIMAGING:    IMPRESSION:   2.6 x 2.1 cm calcified meningioma in the anterior LEFT   parafalcine region.    extensive mucosal thickening in the LEFT frontal   sinus and anterior LEFT ethmoid sinuses.      Please contact the Neurology consult service with any neurological questions.  Discussed with Dr. Holloway NEUROLOGY CONSULT NOTE    NAME:  GENE RANDLE    ASSESSMENT:  89 year old F with PMH of HTN, Pre-DM presents with acute onset dizziness since last night admitted for rule out CVA. May be secondary to vertigo vs posterior circulation stroke. NIHSS 0    RECOMMENDATIONS:    1. Stroke workup  - CT Angiogram Head & Neck  - MRI Brain & IAC with and without contrast to rule out posterior circulation stroke  - Carotid ultrasound  - Transthoracic Echocardiogram  - Telemetry monitoring while inpatient  - Hemoglobin A1c 5.8  - Fasting lipid panel, LDL 93    2. Secondary stroke prevention  - Q4H Neurochecks & Vital signs  - Bedside swallow evaluation before administering any PO meds  - Aspirin 81mg PO Daily (or Aspirin 300mg [place] daily if NPO)  - Atorvastatin 40mg PO QHS (goal LDL < 70 mg/dL)  - Treat BP if over 180/110 within first 24 hours of last seen normal; thereafter treat if over 140/90 (goal /80)  - Diabetes management as per primary team  - PT/OT  - DVT ppx: SCDs, Enoxaparin or Heparin    3. R/o peripheral neuropathy  - obtain vitamin B12, folate, TSH levels    NOTE TO BE COMPLETED - PLEASE REFER TO ABOVE ONLY AND IGNORE INFORMATION BELOW  *******************************    CHIEF COMPLAINT:  Patient is a 89y old  Female who presents with a chief complaint of dizziness (02 Mar 2023 03:01)    HPI:  89 yrs old female from home, AAOx3 at baseline, ambulating with walker with pmhx of HTN, prediabetes presented with dizziness of one day duration. Hx taken from both patient and daughter in-law present at bedside. Pt reported of dizziness upon waking up in the morning, described mainly as being out of balance. She denied any fall, LOC, seizure, headache, chest pain, nausea/vomiting, however  had bilateral lower extremity weakness (both legs felt sleepy). Pt denied any sensory or motor deficit, slurred speech or facial droop. she reported of having shortness of breath which started in the past couple of weeks mainly on exertion without any chest pain. Pt denied any other symptoms such as abdominal pain, diarrhea, constipation, urinary symptoms. Pt lives with her grand-daughter, ambulates using walker for the past year, walks mainly at home, Pt has lost one of her daughters in the past few months due to cancer.  (02 Mar 2023 03:01)    NEURO HPI:  Patient seen with granddaughter at bedside. Reports that she has been having head spinning associated with dizziness since last night. Reports leg cramping which was limited to last night and now resolved. Also mentions history of neuropathy and tried gabapentin but had dizziness in the past. She denies lightheadedness, any headache, focal weakness, slurred speech, facial droop.    PAST MEDICAL & SURGICAL HISTORY:  Prediabetes    HTN (hypertension)    History of cataract surgery    MEDICATIONS:  aspirin  chewable 81 milliGRAM(s) Oral daily  atorvastatin 40 milliGRAM(s) Oral at bedtime  insulin lispro (ADMELOG) corrective regimen sliding scale   SubCutaneous every 4 hours  sodium chloride 0.9%. 1000 milliLiter(s) IV Continuous <Continuous>    ALLERGIES:  No Known Allergies    FAMILY HISTORY:  FH: breast cancer (Child)  FH: brain cancer (Child)      SOCIAL HISTORY:  Denies alcohol, tobacco, or illicit drug use    REVIEW OF SYSTEMS:  GENERAL: No fever, weight changes, fatigue  EYES: No eye pain or discharge  EAR/NOSE/MOUTH/THROAT: No sinus or throat pain; No difficulty hearing  NECK: No pain or stiffness  RESPIRATORY: No cough, wheezing, chills, or hemoptysis  CARDIOVASCULAR: No chest pain, palpitations, shortness of breath, or dyspnea on exertion  GASTROINTESTINAL: No abdominal pain, nausea, vomiting, hematemesis, diarrhea, or constipation  GENITOURINARY: No dysuria, frequency, hematuria, or incontinence  SKIN: No rashes or lesions  ENDOCRINE: No heat or cold intolerance  HEMATOLOGIC: No easy bruising or bleeding  PSYCHIATRIC: No depression, anxiety, or mood swings  MUSCULOSKELETAL: No joint pain or swelling  NEUROLOGICAL: As per HPI    OBJECTIVE:    Vital Signs Last 24 Hrs  T(C): 36.9 (02 Mar 2023 11:00), Max: 36.9 (02 Mar 2023 11:00)  T(F): 98.5 (02 Mar 2023 11:00), Max: 98.5 (02 Mar 2023 11:00)  HR: 64 (02 Mar 2023 11:00) (63 - 75)  BP: 132/66 (02 Mar 2023 11:00) (103/64 - 195/69)  BP(mean): --  RR: 18 (02 Mar 2023 11:00) (16 - 18)  SpO2: 97% (02 Mar 2023 11:00) (96% - 98%)    Parameters below as of 02 Mar 2023 11:00  Patient On (Oxygen Delivery Method): room air      General Examination:  General: No acute distress  HEENT: Atraumatic, Normocephalic  Respiratory: CTA B/l.  No crackles, rhonchi, or wheezes.  Cardiovascular: RRR.  Normal S1 & S2.  Normal b/l radial and pedal pulses.    Neurological Examination:  General / Mental Status: AAO x 3.  No aphasia or dysarthria.  Naming and repetition intact.  Cranial Nerves: VFF x 4.  PERRL.  EOMI x 2, No nystagmus or diplopia.  B/l V1-V3 equal and intact to light touch and pinprick.  Symmetric facial movement and palate elevation.  B/l hearing equal to finger rub.  5/5 strength with b/l sternocleidomastoid & trapezius.  Midline tongue protrusion, with no atrophy or fasciculations.  Motor: Normal bulk & tone in all four extremities.  5/5 strength throughout all four extremities.  No downward drift, rigidity, spasticity, or tremors in any of the four extremities.  Sensory: Intact to light touch and pinprick in all four extremities.  Negative Romberg.  Reflex: 2+ and symmetric at b/l biceps, triceps, brachioradialis, patellae, and ankles.  Downgoing toes b/l.  Coordination: No dysmetria with b/l finger-to-nose and heel raise tests.  Symmetric rapid alternating movements b/l.  Gait: Normal, narrow-based gait.  No difficulty with tiptoe, heel, and tandem gaits.  NIHSS Score:    LOC - 0  LOC Questions - 0  LOC Commands - 0  Gaze Preference - 0  Visual Fields - 0  Facial Palsy - 0  Motor Arm Left - 0  Motor Arm Right - 0  Motor Leg Left - 0  Motor Leg Right - 0  Limb Ataxia - 0  Sensory - 0  Language - 0  Speech - 0  Extinction - 0    NIHSS Score Total: 0    Modified [place] Scale: 1    LABORATORY VALUES:                        10.2   6.14  )-----------( 136      ( 02 Mar 2023 04:50 )             31.1       03-02    143  |  114<H>  |  29<H>  ----------------------------<  109<H>  3.5   |  23  |  1.37<H>    Ca    8.6      02 Mar 2023 04:50  Phos  2.7     03-02  Mg     1.7     03-02    TPro  6.2  /  Alb  2.9<L>  /  TBili  0.5  /  DBili  x   /  AST  20  /  ALT  16  /  AlkPhos  65  03-02    LIVER FUNCTIONS - ( 02 Mar 2023 04:50 )  Alb: 2.9 g/dL / Pro: 6.2 g/dL / ALK PHOS: 65 U/L / ALT: 16 U/L DA / AST: 20 U/L / GGT: x           03-02 Chol 169 LDL -- HDL 41<L> Trig 177<H>        NEUROIMAGING:    IMPRESSION:   2.6 x 2.1 cm calcified meningioma in the anterior LEFT   parafalcine region.    extensive mucosal thickening in the LEFT frontal   sinus and anterior LEFT ethmoid sinuses.      Please contact the Neurology consult service with any neurological questions.  Discussed with Dr. Holloway NEUROLOGY CONSULT NOTE    NAME:  GENE RANDLE    ASSESSMENT:  89 year old F with PMH of HTN, Pre-DM presents with acute onset dizziness since last night admitted for rule out CVA. May be secondary to vertigo vs posterior circulation stroke. NIHSS 0    RECOMMENDATIONS:    1. Stroke workup  - CT Angiogram Head & Neck  - MRI Brain & IAC with and without contrast to rule out posterior circulation stroke  - Carotid ultrasound  - Transthoracic Echocardiogram  - Telemetry monitoring while inpatient  - Hemoglobin A1c 5.8  - Fasting lipid panel, LDL 93    2. Secondary stroke prevention  - Q4H Neurochecks & Vital signs  - Bedside swallow evaluation before administering any PO meds  - Aspirin 81mg PO Daily (or Aspirin 300mg [place] daily if NPO)  - Atorvastatin 40mg PO QHS (goal LDL < 70 mg/dL)  - No role for permissive HTN given NIHSS 0; treat if over 140/90 (goal /80)  - Diabetes management as per primary team  - PT/OT  - DVT ppx: SCDs, Enoxaparin or Heparin    3. R/o peripheral neuropathy  - obtain vitamin B12, folate, TSH levels    NOTE TO BE COMPLETED - PLEASE REFER TO ABOVE ONLY AND IGNORE INFORMATION BELOW  *******************************    CHIEF COMPLAINT:  Patient is a 89y old  Female who presents with a chief complaint of dizziness (02 Mar 2023 03:01)    HPI:  89 yrs old female from home, AAOx3 at baseline, ambulating with walker with pmhx of HTN, prediabetes presented with dizziness of one day duration. Hx taken from both patient and daughter in-law present at bedside. Pt reported of dizziness upon waking up in the morning, described mainly as being out of balance. She denied any fall, LOC, seizure, headache, chest pain, nausea/vomiting, however  had bilateral lower extremity weakness (both legs felt sleepy). Pt denied any sensory or motor deficit, slurred speech or facial droop. she reported of having shortness of breath which started in the past couple of weeks mainly on exertion without any chest pain. Pt denied any other symptoms such as abdominal pain, diarrhea, constipation, urinary symptoms. Pt lives with her grand-daughter, ambulates using walker for the past year, walks mainly at home, Pt has lost one of her daughters in the past few months due to cancer.  (02 Mar 2023 03:01)    NEURO HPI:  Patient seen with granddaughter at bedside. Reports that she has been having head spinning associated with dizziness since last night. Reports leg cramping which was limited to last night and now resolved. Also mentions history of neuropathy and tried gabapentin but had dizziness in the past. She denies lightheadedness, any headache, focal weakness, slurred speech, facial droop.    PAST MEDICAL & SURGICAL HISTORY:  Prediabetes    HTN (hypertension)    History of cataract surgery    MEDICATIONS:  aspirin  chewable 81 milliGRAM(s) Oral daily  atorvastatin 40 milliGRAM(s) Oral at bedtime  insulin lispro (ADMELOG) corrective regimen sliding scale   SubCutaneous every 4 hours  sodium chloride 0.9%. 1000 milliLiter(s) IV Continuous <Continuous>    ALLERGIES:  No Known Allergies    FAMILY HISTORY:  FH: breast cancer (Child)  FH: brain cancer (Child)      SOCIAL HISTORY:  Denies alcohol, tobacco, or illicit drug use    REVIEW OF SYSTEMS:  GENERAL: No fever, weight changes, fatigue  EYES: No eye pain or discharge  EAR/NOSE/MOUTH/THROAT: No sinus or throat pain; No difficulty hearing  NECK: No pain or stiffness  RESPIRATORY: No cough, wheezing, chills, or hemoptysis  CARDIOVASCULAR: No chest pain, palpitations, shortness of breath, or dyspnea on exertion  GASTROINTESTINAL: No abdominal pain, nausea, vomiting, hematemesis, diarrhea, or constipation  GENITOURINARY: No dysuria, frequency, hematuria, or incontinence  SKIN: No rashes or lesions  ENDOCRINE: No heat or cold intolerance  HEMATOLOGIC: No easy bruising or bleeding  PSYCHIATRIC: No depression, anxiety, or mood swings  MUSCULOSKELETAL: No joint pain or swelling  NEUROLOGICAL: As per HPI    OBJECTIVE:    Vital Signs Last 24 Hrs  T(C): 36.9 (02 Mar 2023 11:00), Max: 36.9 (02 Mar 2023 11:00)  T(F): 98.5 (02 Mar 2023 11:00), Max: 98.5 (02 Mar 2023 11:00)  HR: 64 (02 Mar 2023 11:00) (63 - 75)  BP: 132/66 (02 Mar 2023 11:00) (103/64 - 195/69)  BP(mean): --  RR: 18 (02 Mar 2023 11:00) (16 - 18)  SpO2: 97% (02 Mar 2023 11:00) (96% - 98%)    Parameters below as of 02 Mar 2023 11:00  Patient On (Oxygen Delivery Method): room air      General Examination:  General: No acute distress  HEENT: Atraumatic, Normocephalic  Respiratory: CTA B/l.  No crackles, rhonchi, or wheezes.  Cardiovascular: RRR.  Normal S1 & S2.  Normal b/l radial and pedal pulses.    Neurological Examination:  General / Mental Status: AAO x 3.  No aphasia or dysarthria.  Naming and repetition intact.  Cranial Nerves: VFF x 4.  PERRL.  EOMI x 2, No nystagmus or diplopia.  B/l V1-V3 equal and intact to light touch and pinprick.  Symmetric facial movement and palate elevation.  B/l hearing equal to finger rub.  5/5 strength with b/l sternocleidomastoid & trapezius.  Midline tongue protrusion, with no atrophy or fasciculations.  Motor: Normal bulk & tone in all four extremities.  5/5 strength throughout all four extremities.  No downward drift, rigidity, spasticity, or tremors in any of the four extremities.  Sensory: Intact to light touch and pinprick in all four extremities.  Negative Romberg.  Reflex: 2+ and symmetric at b/l biceps, triceps, brachioradialis, patellae, and ankles.  Downgoing toes b/l.  Coordination: No dysmetria with b/l finger-to-nose and heel raise tests.  Symmetric rapid alternating movements b/l.  Gait: Normal, narrow-based gait.  No difficulty with tiptoe, heel, and tandem gaits.  NIHSS Score:    LOC - 0  LOC Questions - 0  LOC Commands - 0  Gaze Preference - 0  Visual Fields - 0  Facial Palsy - 0  Motor Arm Left - 0  Motor Arm Right - 0  Motor Leg Left - 0  Motor Leg Right - 0  Limb Ataxia - 0  Sensory - 0  Language - 0  Speech - 0  Extinction - 0    NIHSS Score Total: 0    Modified [place] Scale: 1    LABORATORY VALUES:                        10.2   6.14  )-----------( 136      ( 02 Mar 2023 04:50 )             31.1       03-02    143  |  114<H>  |  29<H>  ----------------------------<  109<H>  3.5   |  23  |  1.37<H>    Ca    8.6      02 Mar 2023 04:50  Phos  2.7     03-02  Mg     1.7     03-02    TPro  6.2  /  Alb  2.9<L>  /  TBili  0.5  /  DBili  x   /  AST  20  /  ALT  16  /  AlkPhos  65  03-02    LIVER FUNCTIONS - ( 02 Mar 2023 04:50 )  Alb: 2.9 g/dL / Pro: 6.2 g/dL / ALK PHOS: 65 U/L / ALT: 16 U/L DA / AST: 20 U/L / GGT: x           03-02 Chol 169 LDL -- HDL 41<L> Trig 177<H>        NEUROIMAGING:    IMPRESSION:   2.6 x 2.1 cm calcified meningioma in the anterior LEFT   parafalcine region.    extensive mucosal thickening in the LEFT frontal   sinus and anterior LEFT ethmoid sinuses.      Please contact the Neurology consult service with any neurological questions.  Discussed with Dr. Holloway NEUROLOGY CONSULT NOTE    NAME:  GENE RANDLE      ASSESSMENT:  89 year old RHF with PMH of HTN, Pre-DM presents with acute onset dizziness since last night admitted for rule out CVA. May be secondary to vertigo vs posterior circulation stroke; also with incidental cerebral meningioma. NIHSS 0.      RECOMMENDATIONS:    1. Stroke workup  - MRI Brain & IAC with and without contrast to rule out posterior circulation stroke  - Transthoracic Echocardiogram  - Telemetry monitoring while inpatient  - Hemoglobin A1c 5.8  - Fasting lipid panel, LDL 93    2. Secondary stroke prevention  - Q4H Neurochecks & Vital signs  - Bedside swallow evaluation before administering any PO meds  - Aspirin 81mg PO Daily (or Aspirin 300mg [place] daily if NPO)  - Atorvastatin 40mg PO QHS (goal LDL < 70 mg/dL)  - No role for permissive HTN given NIHSS 0; treat if over 140/90 (goal /80)  - Prediabetes management as per primary team  - PT/OT  - DVT ppx: SCDs, Enoxaparin or Heparin    3. R/o peripheral neuropathy  - Obtain vitamin B12, Folate, TSH levels        *******************************    CHIEF COMPLAINT:  Patient is a 89y old  Female who presents with a chief complaint of dizziness (02 Mar 2023 03:01)      HPI:  89 yrs old female from home, AAOx3 at baseline, ambulating with walker with pmhx of HTN, prediabetes presented with dizziness of one day duration. Hx taken from both patient and daughter in-law present at bedside. Pt reported of dizziness upon waking up in the morning, described mainly as being out of balance. She denied any fall, LOC, seizure, headache, chest pain, nausea/vomiting, however had bilateral lower extremity weakness (both legs felt sleepy). Pt denied any sensory or motor deficit, slurred speech or facial droop. she reported of having shortness of breath which started in the past couple of weeks mainly on exertion without any chest pain. Pt denied any other symptoms such as abdominal pain, diarrhea, constipation, urinary symptoms. Pt lives with her grand-daughter, ambulates using walker for the past year, walks mainly at home, Pt has lost one of her daughters in the past few months due to cancer.  (02 Mar 2023 03:01)      NEURO HPI:  Patient seen with granddaughter at bedside. Reports that she has been having head spinning associated with dizziness since last night. Reports leg cramping which was limited to last night and now resolved. Also mentions history of neuropathy and tried gabapentin but had dizziness in the past. She denies lightheadedness, any headache, focal weakness, slurred speech, facial droop.      PAST MEDICAL & SURGICAL HISTORY:  Prediabetes  HTN (hypertension)  History of cataract surgery      MEDICATIONS:  aspirin  chewable 81 milliGRAM(s) Oral daily  atorvastatin 40 milliGRAM(s) Oral at bedtime  insulin lispro (ADMELOG) corrective regimen sliding scale   SubCutaneous every 4 hours  sodium chloride 0.9%. 1000 milliLiter(s) IV Continuous <Continuous>      ALLERGIES:  No Known Allergies      FAMILY HISTORY:  FH: breast cancer (Child)  FH: brain cancer (Child)      SOCIAL HISTORY:  Denies alcohol, tobacco, or illicit drug use    REVIEW OF SYSTEMS:  GENERAL: No fever, weight changes, fatigue  EYES: No eye pain or discharge  EAR/NOSE/MOUTH/THROAT: No sinus or throat pain; No difficulty hearing  NECK: No pain or stiffness  RESPIRATORY: No cough, wheezing, chills, or hemoptysis  CARDIOVASCULAR: No chest pain, palpitations, shortness of breath, or dyspnea on exertion  GASTROINTESTINAL: No abdominal pain, nausea, vomiting, hematemesis, diarrhea, or constipation  GENITOURINARY: No dysuria, frequency, hematuria, or incontinence  SKIN: No rashes or lesions  ENDOCRINE: No heat or cold intolerance  HEMATOLOGIC: No easy bruising or bleeding  PSYCHIATRIC: No depression, anxiety, or mood swings  MUSCULOSKELETAL: No joint pain or swelling  NEUROLOGICAL: As per HPI          OBJECTIVE:    Vital Signs Last 24 Hrs  T(C): 36.9 (02 Mar 2023 11:00), Max: 36.9 (02 Mar 2023 11:00)  T(F): 98.5 (02 Mar 2023 11:00), Max: 98.5 (02 Mar 2023 11:00)  HR: 64 (02 Mar 2023 11:00) (63 - 75)  BP: 132/66 (02 Mar 2023 11:00) (103/64 - 195/69)  RR: 18 (02 Mar 2023 11:00) (16 - 18)  SpO2: 97% (02 Mar 2023 11:00) (96% - 98%)  Parameters below as of 02 Mar 2023 11:00  Patient On (Oxygen Delivery Method): room air      General Examination:  General: No acute distress  HEENT: Atraumatic, Normocephalic  Respiratory: CTA B/l.  No crackles, rhonchi, or wheezes.  Cardiovascular: RRR.  Normal S1 & S2.  Normal b/l radial and pedal pulses.    Neurological Examination:  General / Mental Status: AAO x 3.  No aphasia or dysarthria.  Naming and repetition intact.  Cranial Nerves: VFF x 4.  PERRL.  EOMI x 2, No nystagmus or diplopia.  B/l V1-V3 equal and intact to light touch and pinprick.  Symmetric facial movement and palate elevation.  B/l hearing equal to finger rub.  Negative [place]-Hallpike maneuver b/l.  5/5 strength with b/l sternocleidomastoid & trapezius.  Midline tongue protrusion, with no atrophy or fasciculations.  Motor: Normal bulk & tone in all four extremities.  5/5 strength throughout all four extremities.  No downward drift, rigidity, spasticity, or tremors in any of the four extremities.  Sensory: Intact to light touch and pinprick in all four extremities.  Negative Romberg.  Reflex: 2+ and symmetric at b/l biceps, triceps, brachioradialis, patellae, and ankles.  Downgoing toes b/l.  Coordination: No dysmetria with b/l finger-to-nose and heel raise tests.  Symmetric rapid alternating movements b/l.  Gait: Normal, narrow-based gait.  No difficulty with tiptoe, heel, and tandem gaits.    NIHSS Score:    LOC - 0  LOC Questions - 0  LOC Commands - 0  Gaze Preference - 0  Visual Fields - 0  Facial Palsy - 0  Motor Arm Left - 0  Motor Arm Right - 0  Motor Leg Left - 0  Motor Leg Right - 0  Limb Ataxia - 0  Sensory - 0  Language - 0  Speech - 0  Extinction - 0    NIHSS Score Total: 0 - No IV TNK due to NIHSS 0 and presentation outside of time window    Modified Wendi Scale: 1          LABORATORY VALUES:                        10.2   6.14  )-----------( 136      ( 02 Mar 2023 04:50 )             31.1       03-02    143  |  114<H>  |  29<H>  ----------------------------<  109<H>  3.5   |  23  |  1.37<H>    Ca    8.6      02 Mar 2023 04:50  Phos  2.7     03-02  Mg     1.7     03-02    TPro  6.2  /  Alb  2.9<L>  /  TBili  0.5  /  DBili  x   /  AST  20  /  ALT  16  /  AlkPhos  65  03-02    03-02 Chol 169 LDL 93 HDL 41<L> Trig 177<H>    A1C with Estimated Average Glucose (03.02.23 @ 04:50)   A1C with Estimated Average Glucose Result: 5.8%   Estimated Average Glucose: 120 mg/dL             NEUROIMAGING:    IMPRESSION:   2.6 x 2.1 cm calcified meningioma in the anterior LEFT   parafalcine region.    extensive mucosal thickening in the LEFT frontal   sinus and anterior LEFT ethmoid sinuses.      Please contact the Neurology consult service with any neurological questions.  Discussed with Dr. Holloway

## 2023-03-02 NOTE — H&P ADULT - PROBLEM SELECTOR PLAN 1
p/w dizziness of one day duration without any focal deficit  neuro exam neg for sensory or motor deficit, cranial nerve and cerebellar exam normal  out of window for tPA  CT head: no acute infarct /intracranial hemorrhage. 2.6 x 2.1 cm calcified meningioma in anterior left parafalcine region    Aspirin, Hold plavix, Statin  NIHSS stroke : 0   Neuro checks and vital signs q4  f/u HbA1C and TSH   f/u speech and swallow eval  f/u PT consult  Consult Neuro  May require MRI p/w dizziness of one day duration without any focal deficit  neuro exam neg for sensory or motor deficit, cranial nerve and cerebellar exam normal  out of window for tPA  CT head: no acute infarct /intracranial hemorrhage. 2.6 x 2.1 cm calcified meningioma in anterior left parafalcine region    Aspirin, Hold plavix, Statin  NIHSS stroke : 0   Neuro checks and vital signs q4  f/u HbA1C and TSH   f/u dysphagia eval  f/u PT consult  Consult Neuro  May require MRI p/w dizziness of one day duration without any focal deficit  neuro exam neg for sensory or motor deficit, cranial nerve and cerebellar exam normal  out of window for tPA  CT head: no acute infarct /intracranial hemorrhage. 2.6 x 2.1 cm calcified meningioma in anterior left parafalcine region    start aspirin Aspirin, Statin for r/o posterior circulation stroke  NIHSS stroke : 0   Neuro checks and vital signs q4  f/u HbA1C and TSH   f/u dysphagia eval  f/u PT consult  Consult Neuro  May require MRI p/w dizziness of one day duration without any focal deficit  neuro exam neg for sensory or motor deficit, cranial nerve and cerebellar exam normal  out of window for tPA  CT head: no acute infarct /intracranial hemorrhage. 2.6 x 2.1 cm calcified meningioma in anterior left parafalcine region    start aspirin Aspirin, Statin for r/o posterior circulation stroke  NIHSS stroke : 0   Neuro checks and vital signs q4  f/u Orthostatic vitals   f/u HbA1C and TSH   f/u dysphagia eval  f/u PT consult  Consult Neuro  May require MRI

## 2023-03-02 NOTE — H&P ADULT - PROBLEM SELECTOR PLAN 4
dyspnea on exertion in the past few weeks  f/u Echo dyspnea on exertion in the past few weeks  proBNP 943, low for age  appears euvolemic  f/u cxr  f/u Echo dyspnea on exertion in the past few weeks  proBNP 943, low for age  appears euvolemic  EKG noted for LBBB , Trops neg, no chest pain   f/u CXR  f/u Echo

## 2023-03-02 NOTE — H&P ADULT - HISTORY OF PRESENT ILLNESS
89 yrs old female with pmhx of HTN, prediabetes presented with dizziness of one day duration. Hx taken from both patient and daughter in-law present at bedside. Pt reported of dizziness upon waking up in the morning, described mainly as being out of balance. She denied any fall, LOC, seizure, headache, chest pain, nausea/vomiting, however  had bilateral lower extremity weakness (both legs felt sleepy). Pt denied any sensory or motor deficit, slurred speech or facial droop. she reported of having shortness of breath which started in the past couple of weeks mainly on exertion without any chest pain. Pt denied any other symptoms such as abdominal pain, diarrhea, constipation, urinary symptoms.  Pt lives with her grand-daughter, ambulates using walker for the past year, walks mainly at home.  89 yrs old female from home, AAOx3 at baseline, ambulating with walker with pmhx of HTN, prediabetes presented with dizziness of one day duration. Hx taken from both patient and daughter in-law present at bedside. Pt reported of dizziness upon waking up in the morning, described mainly as being out of balance. She denied any fall, LOC, seizure, headache, chest pain, nausea/vomiting, however  had bilateral lower extremity weakness (both legs felt sleepy). Pt denied any sensory or motor deficit, slurred speech or facial droop. she reported of having shortness of breath which started in the past couple of weeks mainly on exertion without any chest pain. Pt denied any other symptoms such as abdominal pain, diarrhea, constipation, urinary symptoms. Pt lives with her grand-daughter, ambulates using walker for the past year, walks mainly at home, Pt has lost one of her daughters in the past few months due to cancer.

## 2023-03-02 NOTE — H&P ADULT - PROBLEM SELECTOR PLAN 2
BP of 195/ 69, Hydralazine and Labetalol in ED  Overcorrected to 111/61  Will hold home medication (HCTZ 25 and Labetalol 100)  Monitor BP

## 2023-03-02 NOTE — H&P ADULT - ATTENDING COMMENTS
Patient seen and examined at bedside.    Vital Signs Last 24 Hrs  T(C): 36.7 (01 Mar 2023 23:17), Max: 36.7 (01 Mar 2023 23:17)  T(F): 98 (01 Mar 2023 23:17), Max: 98 (01 Mar 2023 23:17)  HR: 75 (01 Mar 2023 23:17) (64 - 75)  BP: 111/61 (01 Mar 2023 23:17) (111/61 - 195/69)  BP(mean): --  RR: 17 (01 Mar 2023 23:17) (16 - 17)  SpO2: 98% (01 Mar 2023 23:17) (96% - 98%)  Parameters below as of 01 Mar 2023 23:17  Patient On (Oxygen Delivery Method): room air    Labs and imaging studies noted.    Assessment and plan: 89 yo female from home, AAOx3 at baseline, ambulating with walker with PMH of HTN, prediabetes presented with dizziness of one day duration. Hx taken from both patient and daughter in-law present at bedside. Pt reported of dizziness upon waking up in the morning, described mainly as being out of balance. She denied any fall, LOC, seizure, headache, chest pain, nausea/vomiting, however  had bilateral lower extremity weakness (both legs felt sleepy). Pt denied any sensory or motor deficit, slurred speech or facial droop. she reported of having shortness of breath which started in the past couple of weeks mainly on exertion without any chest pain. Pt denied any other symptoms such as abdominal pain, diarrhea, constipation, urinary symptoms. Pt lives with her grand-daughter, ambulates using walker for the past year, walks mainly at home, Pt has lost one of her daughters in the past few months due to cancer.     Patient seen and examined at bedside.  Vital Signs Last 24 Hrs  T(C): 36.7 (01 Mar 2023 23:17), Max: 36.7 (01 Mar 2023 23:17)  T(F): 98 (01 Mar 2023 23:17), Max: 98 (01 Mar 2023 23:17)  HR: 75 (01 Mar 2023 23:17) (64 - 75)  BP: 111/61 (01 Mar 2023 23:17) (111/61 - 195/69)  BP(mean): --  RR: 17 (01 Mar 2023 23:17) (16 - 17)  SpO2: 98% (01 Mar 2023 23:17) (96% - 98%)  Parameters below as of 01 Mar 2023 23:17  Patient On (Oxygen Delivery Method): room air    Labs and imaging studies noted.    Assessment and plan: 89 yo female from home, AAOx3 at baseline, ambulating with walker with PMH of HTN, prediabetes presented with dizziness of one day duration. Hx taken from both patient and daughter in-law present at bedside. Pt reported of dizziness upon waking up in the morning, described mainly as being out of balance. She denied any fall, LOC, seizure, headache, chest pain, nausea/vomiting, however  had bilateral lower extremity weakness (both legs felt sleepy). Pt denied any sensory or motor deficit, slurred speech or facial droop. she reported of having shortness of breath which started in the past couple of weeks mainly on exertion without any chest pain. Pt denied any other symptoms such as abdominal pain, diarrhea, constipation, urinary symptoms. Pt lives with her grand-daughter, ambulates using walker for the past year, walks mainly at home, Pt has lost one of her daughters in the past few months due to cancer.     Patient seen and examined at bedside.  Vital Signs Last 24 Hrs  T(C): 36.7 (01 Mar 2023 23:17), Max: 36.7 (01 Mar 2023 23:17)  T(F): 98 (01 Mar 2023 23:17), Max: 98 (01 Mar 2023 23:17)  HR: 75 (01 Mar 2023 23:17) (64 - 75)  BP: 111/61 (01 Mar 2023 23:17) (111/61 - 195/69)  BP(mean): --  RR: 17 (01 Mar 2023 23:17) (16 - 17)  SpO2: 98% (01 Mar 2023 23:17) (96% - 98%)  Parameters below as of 01 Mar 2023 23:17  Patient On (Oxygen Delivery Method): room air  Elderly female, well appearing  chest CTA b/l, no MRG  abd soft, nt, nd  no FND    Labs and imaging studies noted.  BUN creat 36/1.49, , trop neg, , acetone neg  CT head: 2.6 x 2.1 cm calcified meningioma in the anterior LEFT parafalcine region.   s/p iv Hydralazine and Labetalol in ED. BP dropped from > 110, feeling week.    Assessment and plan: 89 yo female from home, AAOx3 at baseline, ambulating with walker with PMH of HTN, prediabetes presented with dizziness of one day duration, noted to have elevated BP in ED, s/p iv antihypertensives in ED, > 110, feeling week, tulio b/l LE, no focal neuro deficit. CT head showed 2.6 x 2.1 cm, not known prior.     # Dizziness- describe as feeling imbalanced  # HTN urgency/emergency  # Incidental finding of L calcified meningioma, no known h/o malignancy  # LBBB on EKG  # JANINE  # Pre diabetes    - admitted to telemetry unit  - patient with b/l LE weakness and generalized weakness. no FND  - BP overcorrected post iv antihypertensives, hold off po antihypertensives  - closely monitor vitals, orthostatic vitals  - ASA, statins. NIHSS score 0  - CT showing Incidental finding of L calcified meningioma, < 3 cm, no known prior malignancy or known meningioma> likely will jimi outpatient follow up imaging.  - Neuro consult in am  - patient reports exertional dyspnea, no known prior CAD, trop neg, bnp 900, EKG showing LBBB ( no prior EKG to compare available at this time).  - follow ECHO  - c/w iv hydration, repeat BMP< avoid nephrotoxic agents. hold HCTZ.  - SSI  - s/c heparin dvt ppx

## 2023-03-02 NOTE — PATIENT PROFILE ADULT - FALL HARM RISK - HARM RISK INTERVENTIONS

## 2023-03-02 NOTE — H&P ADULT - ASSESSMENT
89 yrs old female with pmhx of HTN, prediabetes presented with dizziness of one day duration. Admitted for dizziness  to rule out posterior cerebellar stroke/TIA, HTN emergency and  JANINE.

## 2023-03-02 NOTE — H&P ADULT - NSHPREVIEWOFSYSTEMS_GEN_ALL_CORE
REVIEW OF SYSTEMS:    CONSTITUTIONAL: No fevers or chills, generalized weakness more in bilateral LE   EYES/ENT: No visual changes; No sore throat   NECK: No pain or stiffness  RESPIRATORY: No cough, wheezing, hemoptysis; shortness of breath on exertion in the past few weeks  CARDIOVASCULAR: No chest pain or palpitations  GASTROINTESTINAL: No abdominal or epigastric pain. No nausea, vomiting, or hematemesis; No diarrhea or constipation. No melena or hematochezia.  GENITOURINARY: No dysuria, frequency or hematuria  NEUROLOGICAL: + Dizziness, No numbness, bilateral LE weakness, able to move all extremities without difficulty   SKIN: No itching, rashes

## 2023-03-02 NOTE — H&P ADULT - NSICDXFAMILYHX_GEN_ALL_CORE_FT
FAMILY HISTORY:  Child  Still living? No  FH: brain cancer, Age at diagnosis: Age Unknown  FH: breast cancer, Age at diagnosis: Age Unknown

## 2023-03-03 VITALS
TEMPERATURE: 98 F | OXYGEN SATURATION: 99 % | RESPIRATION RATE: 17 BRPM | DIASTOLIC BLOOD PRESSURE: 81 MMHG | HEART RATE: 77 BPM | SYSTOLIC BLOOD PRESSURE: 154 MMHG

## 2023-03-03 DIAGNOSIS — R42 DIZZINESS AND GIDDINESS: ICD-10-CM

## 2023-03-03 LAB
ANION GAP SERPL CALC-SCNC: 7 MMOL/L — SIGNIFICANT CHANGE UP (ref 5–17)
BUN SERPL-MCNC: 24 MG/DL — HIGH (ref 7–18)
CALCIUM SERPL-MCNC: 8.6 MG/DL — SIGNIFICANT CHANGE UP (ref 8.4–10.5)
CHLORIDE SERPL-SCNC: 115 MMOL/L — HIGH (ref 96–108)
CO2 SERPL-SCNC: 21 MMOL/L — LOW (ref 22–31)
CREAT SERPL-MCNC: 1.13 MG/DL — SIGNIFICANT CHANGE UP (ref 0.5–1.3)
EGFR: 44 ML/MIN/1.73M2 — LOW
FOLATE SERPL-MCNC: 8.9 NG/ML — SIGNIFICANT CHANGE UP
GLUCOSE BLDC GLUCOMTR-MCNC: 112 MG/DL — HIGH (ref 70–99)
GLUCOSE BLDC GLUCOMTR-MCNC: 156 MG/DL — HIGH (ref 70–99)
GLUCOSE BLDC GLUCOMTR-MCNC: 86 MG/DL — SIGNIFICANT CHANGE UP (ref 70–99)
GLUCOSE BLDC GLUCOMTR-MCNC: 92 MG/DL — SIGNIFICANT CHANGE UP (ref 70–99)
GLUCOSE SERPL-MCNC: 92 MG/DL — SIGNIFICANT CHANGE UP (ref 70–99)
HCT VFR BLD CALC: 29.8 % — LOW (ref 34.5–45)
HGB BLD-MCNC: 9.9 G/DL — LOW (ref 11.5–15.5)
MCHC RBC-ENTMCNC: 30 PG — SIGNIFICANT CHANGE UP (ref 27–34)
MCHC RBC-ENTMCNC: 33.2 GM/DL — SIGNIFICANT CHANGE UP (ref 32–36)
MCV RBC AUTO: 90.3 FL — SIGNIFICANT CHANGE UP (ref 80–100)
NRBC # BLD: 0 /100 WBCS — SIGNIFICANT CHANGE UP (ref 0–0)
PLATELET # BLD AUTO: 124 K/UL — LOW (ref 150–400)
POTASSIUM SERPL-MCNC: 3.6 MMOL/L — SIGNIFICANT CHANGE UP (ref 3.5–5.3)
POTASSIUM SERPL-SCNC: 3.6 MMOL/L — SIGNIFICANT CHANGE UP (ref 3.5–5.3)
RBC # BLD: 3.3 M/UL — LOW (ref 3.8–5.2)
RBC # FLD: 13.3 % — SIGNIFICANT CHANGE UP (ref 10.3–14.5)
SODIUM SERPL-SCNC: 143 MMOL/L — SIGNIFICANT CHANGE UP (ref 135–145)
TSH SERPL-MCNC: 6.67 UU/ML — HIGH (ref 0.34–4.82)
VIT B12 SERPL-MCNC: 400 PG/ML — SIGNIFICANT CHANGE UP (ref 232–1245)
WBC # BLD: 5.8 K/UL — SIGNIFICANT CHANGE UP (ref 3.8–10.5)
WBC # FLD AUTO: 5.8 K/UL — SIGNIFICANT CHANGE UP (ref 3.8–10.5)

## 2023-03-03 PROCEDURE — 83735 ASSAY OF MAGNESIUM: CPT

## 2023-03-03 PROCEDURE — 85027 COMPLETE CBC AUTOMATED: CPT

## 2023-03-03 PROCEDURE — 93005 ELECTROCARDIOGRAM TRACING: CPT

## 2023-03-03 PROCEDURE — 70553 MRI BRAIN STEM W/O & W/DYE: CPT | Mod: 26

## 2023-03-03 PROCEDURE — 84100 ASSAY OF PHOSPHORUS: CPT

## 2023-03-03 PROCEDURE — 84443 ASSAY THYROID STIM HORMONE: CPT

## 2023-03-03 PROCEDURE — 83880 ASSAY OF NATRIURETIC PEPTIDE: CPT

## 2023-03-03 PROCEDURE — 36415 COLL VENOUS BLD VENIPUNCTURE: CPT

## 2023-03-03 PROCEDURE — 82550 ASSAY OF CK (CPK): CPT

## 2023-03-03 PROCEDURE — 70553 MRI BRAIN STEM W/O & W/DYE: CPT

## 2023-03-03 PROCEDURE — 97162 PT EVAL MOD COMPLEX 30 MIN: CPT

## 2023-03-03 PROCEDURE — 82607 VITAMIN B-12: CPT

## 2023-03-03 PROCEDURE — 96374 THER/PROPH/DIAG INJ IV PUSH: CPT

## 2023-03-03 PROCEDURE — 93306 TTE W/DOPPLER COMPLETE: CPT

## 2023-03-03 PROCEDURE — 87635 SARS-COV-2 COVID-19 AMP PRB: CPT

## 2023-03-03 PROCEDURE — 80061 LIPID PANEL: CPT

## 2023-03-03 PROCEDURE — 82962 GLUCOSE BLOOD TEST: CPT

## 2023-03-03 PROCEDURE — 71045 X-RAY EXAM CHEST 1 VIEW: CPT

## 2023-03-03 PROCEDURE — 82009 KETONE BODYS QUAL: CPT

## 2023-03-03 PROCEDURE — 80053 COMPREHEN METABOLIC PANEL: CPT

## 2023-03-03 PROCEDURE — 84484 ASSAY OF TROPONIN QUANT: CPT

## 2023-03-03 PROCEDURE — 83036 HEMOGLOBIN GLYCOSYLATED A1C: CPT

## 2023-03-03 PROCEDURE — 99232 SBSQ HOSP IP/OBS MODERATE 35: CPT

## 2023-03-03 PROCEDURE — A9585: CPT

## 2023-03-03 PROCEDURE — 85025 COMPLETE CBC W/AUTO DIFF WBC: CPT

## 2023-03-03 PROCEDURE — 85730 THROMBOPLASTIN TIME PARTIAL: CPT

## 2023-03-03 PROCEDURE — 99233 SBSQ HOSP IP/OBS HIGH 50: CPT

## 2023-03-03 PROCEDURE — 70450 CT HEAD/BRAIN W/O DYE: CPT | Mod: MA

## 2023-03-03 PROCEDURE — 80048 BASIC METABOLIC PNL TOTAL CA: CPT

## 2023-03-03 PROCEDURE — 85610 PROTHROMBIN TIME: CPT

## 2023-03-03 PROCEDURE — 99239 HOSP IP/OBS DSCHRG MGMT >30: CPT

## 2023-03-03 PROCEDURE — 82746 ASSAY OF FOLIC ACID SERUM: CPT

## 2023-03-03 PROCEDURE — 99285 EMERGENCY DEPT VISIT HI MDM: CPT | Mod: 25

## 2023-03-03 RX ORDER — LABETALOL HCL 100 MG
100 TABLET ORAL EVERY 12 HOURS
Refills: 0 | Status: DISCONTINUED | OUTPATIENT
Start: 2023-03-03 | End: 2023-03-03

## 2023-03-03 RX ORDER — LISINOPRIL 2.5 MG/1
40 TABLET ORAL DAILY
Refills: 0 | Status: DISCONTINUED | OUTPATIENT
Start: 2023-03-03 | End: 2023-03-03

## 2023-03-03 RX ORDER — MECLIZINE HCL 12.5 MG
1 TABLET ORAL
Qty: 30 | Refills: 0
Start: 2023-03-03 | End: 2023-03-12

## 2023-03-03 RX ADMIN — SODIUM CHLORIDE 60 MILLILITER(S): 9 INJECTION INTRAMUSCULAR; INTRAVENOUS; SUBCUTANEOUS at 00:08

## 2023-03-03 RX ADMIN — Medication 100 MILLIGRAM(S): at 17:24

## 2023-03-03 RX ADMIN — Medication 81 MILLIGRAM(S): at 13:09

## 2023-03-03 NOTE — DISCHARGE NOTE PROVIDER - ATTENDING DISCHARGE PHYSICAL EXAMINATION:
Gen: NAD  Neuro: alert, answering qs appropriately, moves all extremities  HEENT: anicteric, moist oral mucosa  Neck: supple, no JVD elevation  Cards: RRR  Pulm: good inspiratory effort, CTAB  Abd: soft, NT/ND, BS+  Ext: no edema  Skin: warm, dry    CTH and MRI brain without acute findings. Calcified meningioma found incidentally. TTE with increased RVP and pulm regurg. Will need o/p cards f/u for workup.

## 2023-03-03 NOTE — DISCHARGE NOTE NURSING/CASE MANAGEMENT/SOCIAL WORK - PATIENT PORTAL LINK FT
You can access the FollowMyHealth Patient Portal offered by [facility] by registering at the following website: [email or web address]. By joining [facility]’s FollowMyHealth portal, you will also be able to view your health information using other applications (apps) compatible with our system.

## 2023-03-03 NOTE — PHYSICAL THERAPY INITIAL EVALUATION ADULT - DIAGNOSIS, PT EVAL
(ICF Model) Pt. present w/deficits in Body Structures/Function (Impairments), incl: Strength, Balance, Endurance, XXXX, XXXX, leading to deficits in performing the below noted Activities (Limitations).

## 2023-03-03 NOTE — PROGRESS NOTE ADULT - PROBLEM SELECTOR PLAN 1
CT head showed 2.6 x 2.1 cm calcified meningioma in anterior left parafalcine region  r/o TIA vs cerebellar infarct   MRI brain and internal auditory canal   Can be followed outpatient if echo is normal   c/w Aspirin, Statin for r/o posterior circulation stroke  neuro checks q 4 hrs   f/u orthostatic vitals   Dr. Holloway following
CT head showed 2.6 x 2.1 cm calcified meningioma in anterior left parafalcine region  r/o TIA vs cerebellar infarct   f/u MRI brain and internal auditory canal   f/u TTE  Can be followed outpatient if echo is normal   c/w Aspirin, Statin for r/o posterior circulation stroke  neuro checks q 4 hrs   f/u orthostatic vitals   Dr. Holloway following

## 2023-03-03 NOTE — DISCHARGE NOTE PROVIDER - HOSPITAL COURSE
89 yrs old female with pmhx of HTN, prediabetes presented with vertigo. CT head showed 2.6 x 2.1 cm calcified meningioma in anterior left parafalcine region, neuro Dr. Holloway consulted. Patient admitted  to rule out posterior cerebellar stroke/TIA, Neurology Dr. Holloway recommended MRI of brain and internal auditory canal. But can be followed outpatient.  Patient also noted to have HTN emergency and  elevated BNP. Chest x ray showed clear lungs with no evidence of pneumonia, pleural effusion, or CHF. CT head showed 2.6 x 2.1 cm calicifed meningioma in anterior left parafalcine region. MRI brain and internal auditory canal showed no current evidence of acute ischemia, extra-axial mass along the left side of the falx likely a meningioma. No evidence of adjacent edema. Opacified left frontal sinus and anterior left ethmoid air cells. TTE showed ____. Neurology Dr. Holloway following. Pt presented with hypertensive emergency with /69 on admission, pt was given hydralazine and labetalol. Home meds lisinopril, hydrochlorothiaziden and labetalol were resumed. Troponin was negative and pt  not have any chest pain. Pt presented with JANINE, with SCr of 1.49, likely pre-renal, resolved with IVF. A1c 5.8. Pt deemed medically stable for discharge. Pt will need to follow up with Cardiology and Neurology outpatient.   BP on admission 195/89 , s/p hydralazine and labetalol, pt bp stable resume hydrochlorothiazide     89 yrs old female with pmhx of HTN, prediabetes presented with vertigo. CT head showed 2.6 x 2.1 cm calcified meningioma in anterior left parafalcine region, neuro Dr. Holloway consulted. Patient admitted  to rule out posterior cerebellar stroke/TIA, Neurology Dr. Holloway recommended MRI of brain and internal auditory canal. But can be followed outpatient.  Patient also noted to have HTN emergency and  elevated BNP. Chest x ray showed clear lungs with no evidence of pneumonia, pleural effusion, or CHF. CT head showed 2.6 x 2.1 cm calicifed meningioma in anterior left parafalcine region. MRI brain and internal auditory canal showed no current evidence of acute ischemia, extra-axial mass along the left side of the falx likely a meningioma. No evidence of adjacent edema. Opacified left frontal sinus and anterior left ethmoid air cells. TTE showed G1DD, EF 55-60%. Neurology Dr. Holloway following. Pt presented with hypertensive emergency with /69 on admission, pt was given hydralazine and labetalol. Home meds lisinopril, hydrochlorothiaziden and labetalol were resumed. Troponin was negative and pt  not have any chest pain. Pt presented with JANINE, with SCr of 1.49, likely pre-renal, resolved with IVF. A1c 5.8. Pt deemed medically stable for discharge. Pt will need to follow up with Cardiology and Neurology outpatient.   BP on admission 195/89 , s/p hydralazine and labetalol, pt bp stable resume hydrochlorothiazide

## 2023-03-03 NOTE — PROGRESS NOTE ADULT - ATTENDING COMMENTS
89 yo female from home, AAOx3 at baseline, ambulating with walker with PMH of HTN, prediabetes presented with dizziness of one day duration, noted to have elevated BP in ED, s/p iv antihypertensives in ED, > 110, feeling week, tulio b/l LE, no focal neuro deficit. CT head showed 2.6 x 2.1 cm, not known prior.     # Dizziness- describe as feeling imbalanced  # HTN urgency/emergency  # Incidental finding of L calcified meningioma, no known h/o malignancy  # LBBB on EKG  # JANINE  # Pre diabetes    - admitted to telemetry unit  - patient with b/l LE weakness and generalized weakness. no FND  - resume home BP meds  - ASA, statins. NIHSS score 0  - CT showing Incidental finding of L calcified meningioma, < 3 cm, no known prior malignancy or known meningioma> likely will jimi outpatient follow up imaging.  - f/u Dr. Holloway: MRI brain done without sig findings  - patient reports exertional dyspnea, no known prior CAD, trop neg, bnp 900, EKG showing LBBB ( no prior EKG to compare available at this time).  - f/u TTE  - c/w iv hydration, repeat BMP< avoid nephrotoxic agents. hold HCTZ.  - SSI  - s/c heparin dvt ppx.

## 2023-03-03 NOTE — DISCHARGE NOTE PROVIDER - NSDCCPCAREPLAN_GEN_ALL_CORE_FT
PRINCIPAL DISCHARGE DIAGNOSIS  Diagnosis: Meningioma  Assessment and Plan of Treatment: You presented to the hospital due to dizziness and vertigo. You were evaluated in the ED by the primary medical team, cardiology and neurology. CT scan of your head showed a meningioma as well as mucosal thickening in your sinuses. MRI of your head showed similar findings. Ultrasound of your heart showed____. Your symptoms resolved during your hsopital stay. You will need to follow up with the cardiology and neurology team when you are discharged from the hospital. Please follow up with them as well as your primary care doctor in 1-2 weeks.      SECONDARY DISCHARGE DIAGNOSES  Diagnosis: Hypertensive emergency  Assessment and Plan of Treatment: You presented to the hospital with elevated blood pressure of 195/69. You were given medication to lower your blood pressure in the ED. You normally take 3 medications at home to control your blood pressure. Please continue taking these medications when you are discharged from the hospital and frequently check your blood pressure. Please follow up with your primary care doctor in 1-2 weeks after you are discharged from the hospital    Diagnosis: JANINE (acute kidney injury)  Assessment and Plan of Treatment: You were noted to have an acute kidney injury on admission. This is likely due to poor diet intake. Your creatinine improved with IV fluids. Please increase your diet and fluid intake when you are discharged from the hospital and follow up with your primary care doctor in 1-2 weeks.     PRINCIPAL DISCHARGE DIAGNOSIS  Diagnosis: Meningioma  Assessment and Plan of Treatment: You presented to the hospital due to dizziness and vertigo. You were evaluated in the ED by the primary medical team, cardiology and neurology. CT scan of your head showed a meningioma as well as mucosal thickening in your sinuses. MRI of your head showed similar findings. Ultrasound of your heart was normal. Your symptoms resolved during your hsopital stay. You will need to follow up with the cardiology and neurology team when you are discharged from the hospital. Please follow up with them as well as your primary care doctor in 1-2 weeks.      SECONDARY DISCHARGE DIAGNOSES  Diagnosis: Hypertensive emergency  Assessment and Plan of Treatment: You presented to the hospital with elevated blood pressure of 195/69. You were given medication to lower your blood pressure in the ED. You normally take 3 medications at home to control your blood pressure. Please continue taking these medications when you are discharged from the hospital and frequently check your blood pressure. Please follow up with your primary care doctor in 1-2 weeks after you are discharged from the hospital    Diagnosis: JANINE (acute kidney injury)  Assessment and Plan of Treatment: You were noted to have an acute kidney injury on admission. This is likely due to poor diet intake. Your creatinine improved with IV fluids. Please increase your diet and fluid intake when you are discharged from the hospital and follow up with your primary care doctor in 1-2 weeks.     PRINCIPAL DISCHARGE DIAGNOSIS  Diagnosis: Meningioma  Assessment and Plan of Treatment: You presented to the hospital due to dizziness and vertigo. You were evaluated in the ED by the primary medical team, cardiology and neurology. CT scan of your head showed a meningioma as well as mucosal thickening in your sinuses. MRI of your head showed similar findings. Ultrasound of your heart was normal. Your symptoms resolved during your hsopital stay. You will need to follow up with the cardiology and neurology team when you are discharged from the hospital. Please follow up with them as well as your primary care doctor in 1-2 weeks.      SECONDARY DISCHARGE DIAGNOSES  Diagnosis: JANINE (acute kidney injury)  Assessment and Plan of Treatment: You were noted to have an acute kidney injury on admission. This is likely due to poor diet intake. Your creatinine improved with IV fluids. Please increase your diet and fluid intake when you are discharged from the hospital and follow up with your primary care doctor in 1-2 weeks.    Diagnosis: Hypertensive emergency  Assessment and Plan of Treatment: You presented to the hospital with elevated blood pressure of 195/69. You were given medication to lower your blood pressure in the ED. You normally take 3 medications at home to control your blood pressure. Please continue taking these medications when you are discharged from the hospital and frequently check your blood pressure. Please follow up with your primary care doctor in 1-2 weeks after you are discharged from the hospital

## 2023-03-03 NOTE — DISCHARGE NOTE PROVIDER - PROVIDER TOKENS
PROVIDER:[TOKEN:[[id number]]] PROVIDER:[TOKEN:[[id number]]],PROVIDER:[TOKEN:[[id number]]]

## 2023-03-03 NOTE — DISCHARGE NOTE PROVIDER - CARE PROVIDER_API CALL
Cricket Holloway)  Neurology  Epilepsy  [address], [address]  [place] [address]  Phone: [phone]  Fax: ()-  Follow Up Time:    Cricket Holloway)  Neurology  Epilepsy  [address], [address]  [place] [address]  Phone: [phone]  Fax: ()-  Follow Up Time:     Sriram Aguillon)  Cardiology  [address] [place] [address]  Phone: [phone]  Fax: [phone]  Follow Up Time:

## 2023-03-03 NOTE — PHYSICAL THERAPY INITIAL EVALUATION ADULT - IMPAIRMENTS FOUND, PT EVAL
Rheumatology Clinic Visit      Maverick Richards MRN# [id number]   YOB: 1979 Age: 42 year old      Date of visit: 11/05/21   PCP: Balbina Alva    Chief Complaint   Patient presents with:  RECHECK    Assessment and Plan     1.  Spondyloarthropathy, axial and peripheral involvement: Establish care with me 2/5/2020 at which point he had R>L MCP, PIP, DIP joint involvement that is inflammatory in nature.  Ankle and knee pain is degenerative in nature.  Axial symptoms had mixed inflammatory and degenerative symptoms; right sacroiliitis suspected based on x-rays.  Additionally, he has a history of bright red blood and black stools 1-2 times per month starting in 2018 and this in the setting of inflammatory arthritis raises the concern for an inflammatory bowel disease so he has been previously referred to gastroenterology and he is planning to have a colonoscopy post-COVID19 pandemic.  At the visit on 9/25/2020 he had not yet started Humira but he continued to have inflammatory joint symptoms.  Humira was then started prior to having any GI work-up and he has felt much better, worse when he was not on Humira when he was lost to follow-up.  Joint symptoms have improved and he has no synovitis on exam today, but still has mild low back pain and stiffness for about an hour in the morning.  Advised that he still needs to have GI work-up.  May consider changing to a different TNF inhibitor in the future, or consider adding a conventional DMARD such as sulfasalazine if needed.  Avoiding IL-17 at this time due to suspected inflammatory bowel disease.  Chronic illness  - Continue Humira 40mg SQ every 14 days   - Labs today: CBC, Creatinine, Hepatic Panel, ESR, CRP, QuantiFERON-TB gold plus    2.  Intermittent bright red blood and black stools, reportedly 1-2 times per month for a couple years: Seen by Dr. Alban Alcantara at [facility]; planning for colonoscopy post-COVID19, and possibly sooner given the improvement  but not resolution of the COVID-19 pandemic    3.  Left shoulder pain: Consistent with impingement syndrome.  He is doing physical therapy exercises about once weekly.  Symptoms have significantly improved.  Advised that he increase the frequency of his exercises.     4.  Lower back pain with right-sided sciatica: Encouraged physical therapy exercises more frequently at home.    5. Vitamin D deficiency: Was on erogocalciferol 50,000 units once weekly and never had repeat labs for vitamin D.  Changed to vitamin D 1000 IU daily  - Continue vitamin D 1000 IU daily    6.  Vaccinations: Vaccinations reviewed with Mr. Richards.  Risks and benefits of vaccinations were discussed.    - Influenza: encouraged yearly vaccination; he initially agreed to get this vaccination today but then when Mabel was going to give it to him he declined  - [id number]: advised receiving  - [id number]: advised receiving at least 8 weeks after [id number] is administered  - Shingrix: Advised receiving; patient is going to check on insurance coverage before determining if it is going to be received   - TDAP: Administered today  - COVID-19: has received the Pfizer COVID-19 vaccine on 3/17/2021 and 4/7/2021      A single additional dose of the Pfizer or Moderna COVID-19 vaccine is recommended at least 28 days after the completion of the 2-dose mRNA vaccine series for patients receiving any immunosuppressive or immunomodulatory therapy. Attempts should be made to match the additional mRNA dose type to the type given in the mRNA primary series; however, if that is not feasible, a booster dose with the alternative mRNA vaccine is permitted.    Based on our current understanding (and this may change over time as we learn more), medications should be adjusted as noted below, if disease activity allows:  - NSAIDs and Acetaminophen: hold for 24 hours prior to vaccination if able to do so    Total minutes spent in evaluation with patient, documentation,  , and review of pertinent studies and chart notes: 20    Mr. Richards verbalized agreement with and understanding of the rational for the diagnosis and treatment plan.  All questions were answered to best of my ability and the patient's satisfaction. Mr. Richards was advised to contact the clinic with any questions that may arise after the clinic visit.      Thank you for involving me in the care of the patient    Return to clinic: 6 months      HPI     Maverick Richards is a 42 year old male who presents for follow-up of spondyloarthropathy    1/30/2020: Mr. Richards reported 5 years of right wrist pain, worse with certain movements that will cause pain for 1-7days that gradually improves and then another thing will trigger it again; was a period of a doing okay with a couple months then he openned the refrigerator door and it happened again; doesn't swell; maybe increased warmth; sharp shooting pain; pain will radiate to the dorsal right 2nd-4th fingers and sometimes to the volar forearm just proximal to the right wrist.  L>R shoulder pain with certain positions and he demonstrates pain with abduction. R>L MCPs>PIPs>DIPs hurt; more pain in the AM, repetitive fine motor movement at work that worsens his pain; when doing heavy lifting prior to this job he had similar symptoms and would need to adjust how he lifted things. Elbows hurt sometimes. Hips are okay.  Knees hurt at the end of the day; improve with rest; no swelling.  Ankles are worse with activity; improve with rest; long history of spraining ankles over and over again; says that he has been eval'd several times including orthopedics and has been told to rest, splint sometimes with ACE bandages, and ice.  Toes are okay.  Morning stiffness for 1 hour, better with a hot shower; +gelling.  He then says that while he is active during the day he feels so much better, and if he sits down to rest then he feels terrible because it is so hard to get going  again.  He says that when he gets home he has to get everything done right away because if he sits to relax then it is hard to get up again.     5/6/2020: no change in symptoms.  Didn't start Humira because of anticipation that it may change the colonoscopy results; wasn't concerned about COVID19 related issues.  Still with pain and stiffness and plans to start Humira for these symptoms as he doesn't know when the colonoscopy will be able to be done.     9/25/2020: Still with bloody stools intermittently but not for the last few weeks.  Pain at his MCPs more than the PIPs but also involving the DIPs that is worse in the morning and improves with time and activity.  Too much fine motor movement also bothers his hands.  Morning stiffness for at least 1 hour but is better with a hot shower.  Positive gelling phenomenon.  Lower back stiffness and pain is worse in the morning and improves with time and activity.  Had a couple times of sharp lower back pain that radiated to his right leg and he went to a chiropractor but does not want to keep going to a chiropractor he says.  Continues on high-dose vitamin D but is now out.  Left shoulder pain with abduction above 90 degrees; range of motion intact per patient.      7/9/2021: First time evaluated since he started Humira.  He says that when he was on Humira consistently he felt much better.  Now taking Humira inconsistently and feels worse, with worsening morning stiffness and pain in his hands and back.  Pain and stiffness improves with time and activity.  Not having any bloody stools.    Today, 11/5/2021: Currently doing well with Humira he says.  Tolerating Humira well.  However, still has 1-2 hours of morning stiffness affecting his lower back, and ache in his lower back and is worse in the morning improves with time and activity.  Bowel symptoms have improved; no constipation or diarrhea no blood in his stool.  Has not had a colonoscopy.    Denies fevers, chills,  nausea, vomiting, constipation, diarrhea. No abdominal pain.   No chest pain/pressure, palpitations, or shortness of breath. No LE swelling. No neck pain. No oral or nasal sores.  No rash. No sicca symptoms.  No eye irritation or pain.    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  Past Surgical History     Past Surgical History:   Procedure Laterality Date     ENT SURGERY      tonsilectomy age 2     Allergy   No Known Allergies  Current Medication List     Current Outpatient Medications   Medication Sig     adalimumab (HUMIRA *CF*) 40 MG/0.4ML pen kit Inject 0.4 mLs (40 mg) Subcutaneous every 14 days . Hold for signs of infection, then seek medical attention.     ketoconazole (NIZORAL) 2 % external shampoo Use small amount as shampoo and face/body wash every other day.     medical cannabis (Patient's own supply) See Admin Instructions (The purpose of this order is to document that the patient reports taking medical cannabis.  This is not a prescription, and is not used to certify that the patient has a qualifying medical condition.)     minocycline (MINOCIN) 100 MG capsule Take 1 capsule (100 mg) by mouth 2 times daily     triamcinolone (KENALOG) 0.1 % external cream Apply a thin layer twice daily to affected areas as needed.     Vitamin D, Cholecalciferol, 25 MCG (1000 UT) CAPS Take 1,000 Units by mouth daily     No current facility-administered medications for this visit.         Social History   See HPI    Family History   No known family history of rheumatologic disorder    Physical Exam     Temp Readings from Last 3 Encounters:   04/21/21  "98.3  F (36.8  C) (Oral)   07/31/19 97.4  F (36.3  C) (Oral)   10/06/14 97.3  F (36.3  C) (Oral)     BP Readings from Last 5 Encounters:   11/05/21 120/80   04/21/21 112/73   02/05/20 128/80   01/30/20 115/68   07/31/19 109/69     Pulse Readings from Last 1 Encounters:   11/05/21 87     Resp Readings from Last 1 Encounters:   No data found for Resp     Estimated body mass index is 32.41 kg/m  as calculated from the following:    Height as of this encounter: 1.676 m (5' 6\").    Weight as of this encounter: 91.1 kg (200 lb 12.8 oz).      GEN: NAD.    HEENT:  Anicteric, noninjected sclera. No obvious external lesions of the ear and nose. Hearing intact.  CV: S1, S2. RRR. No m/r/g  PULM: No increased work of breathing. CTA bilaterally   MSK: MCPs, PIPs, DIPs without swelling or tenderness to palpation.  Wrists without swelling or tenderness to palpation.  Elbows and shoulders without swelling or tenderness to palpation.  Shoulders with normal range of motion.   Knees, ankles, and MTPs without swelling or tenderness to palpation.    SKIN: No rash or jaundice seen  PSYCH: Alert. Appropriate.        Labs / Imaging (select studies)   RF/CCP  Recent Labs   Lab Test 01/30/20  0950   CCPIGG 1   RHF <20     CBC  Recent Labs   Lab Test 02/10/21  1630 01/30/20  0950   WBC 7.7 6.5   RBC 4.95 5.00   HGB 14.2 14.3   HCT 44.3 43.6   MCV 90 87   RDW 13.6 13.1    265   MCH 28.7 28.6   MCHC 32.1 32.8   NEUTROPHIL 40.1 40.6   LYMPH 48.7 48.7   MONOCYTE 8.0 7.7   EOSINOPHIL 2.7 2.5   BASOPHIL 0.5 0.5   ANEU 3.1 2.6   ALYM 3.8 3.2   MASTER 0.6 0.5   AEOS 0.2 0.2   ABAS 0.0 0.0     CMP  Recent Labs   Lab Test 02/10/21  1630 01/30/20  0950 10/06/14  1009   NA  --   --  136   POTASSIUM  --   --  4.1   CHLORIDE  --   --  103   CO2  --   --  26   ANIONGAP  --   --  7   GLC  --   --  80   BUN  --   --  10   CR 0.95 0.90 0.95   GFRESTIMATED >90 >90 >90  Non African American GFR Calc     GFRESTBLACK >90 >90 >90   GFR Calc   "   KALEY  --   --  9.6   BILITOTAL 0.2 0.5 0.3   ALBUMIN 4.1 3.9 4.1   PROTTOTAL 7.3 7.5 8.0   ALKPHOS 70 64 75   AST 26 22 23   ALT 38 37 40     Calcium/VitaminD  Recent Labs   Lab Test 02/10/21  1630 01/30/20  0950 10/06/14  1009   KALEY  --   --  9.6   VITDT 21 10*  --      ESR/CRP  Recent Labs   Lab Test 02/10/21  1630 01/30/20  0950   SED 5 5   CRP <2.9 3.7     Hepatitis B  Recent Labs   Lab Test 01/30/20  0950   HBCAB Nonreactive   HEPBANG Nonreactive     Hepatitis C  Recent Labs   Lab Test 01/30/20  0950   HCVAB Nonreactive     Lyme ab screening  Recent Labs   Lab Test 01/30/20  0950   LYMEGM 0.19     Tuberculosis Screening  Recent Labs   Lab Test 02/05/20  1545   TBRES Negative     Recent Results (from the past 744 hour(s))   XR Sacroiliac Joint 1/2 Views    Narrative    SACROILIAC JOINT ONE TO TWO VIEWS   1/30/2020 10:36 AM     HISTORY:  Sacroiliitis question. Multiple joint pain.      Impression    IMPRESSION: I suspect mild right sacroiliitis versus degenerative  change.    MARIAN MARSHALL MD   XR Chest 2 Views    Narrative    XR CHEST 2 VW   1/30/2020 10:36 AM    INDICATION: Multiple joint pain.    COMPARISON: No pertinent comparison study is available for review.    FINDINGS:   No focal pulmonary infiltrate, pleural effusion, or pneumothorax. The  cardiac size and mediastinal contours appear within normal limits.     No acute fracture or dislocation. No discrete osseous lesion or  radiopaque foreign body.     CONCLUSION:  No acute abnormality.    ELIZABETH KAT MD   XR Foot Bilateral G/E 3 Views    Narrative    FOOT BILATERAL THREE OR MORE VIEWS   1/30/2020 11:29 AM     HISTORY:  Polyarticular joint pain; evaluating for inflammatory  arthritis or other etiology to explain symptoms. Multiple joint pain.      Impression    IMPRESSION: Unremarkable exam.    MARIAN MARSHALL MD   XR Hand Bilateral G/E 3 Views    Narrative    HAND BILATERAL THREE OR MORE VIEWS 1/30/2020 11:29 AM     HISTORY: Polyarticular joint pain.  Evaluating for inflammatory  arthritis or other etiology to explain symptoms. Multiple joint pain.      Impression    IMPRESSION:   1. Left: Positive ulnar variance. 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  2. Right: Unremarkable.    MARIAN MARSHALL MD         Immunization History     Immunization History   Administered Date(s) Administered     COVID-19,PF,Pfizer (12+ Yrs) 03/17/2021, 04/07/2021     Td (Adult), Adsorbed 04/19/1999          Chart documentation done in part with Dragon Voice recognition Software. Although reviewed after completion, some word and grammatical error may remain.        Miguel Huerta MD          aerobic capacity/endurance/gait, locomotion, and balance/gross motor/joint integrity and mobility/muscle strength

## 2023-03-03 NOTE — DISCHARGE NOTE PROVIDER - NSDCMRMEDTOKEN_GEN_ALL_CORE_FT
calcium carbonate: 1 tab(s) orally once a day for calcium supplement  Claritin 10 mg oral tablet: 1 tab(s) orally once a day, As Needed  fosinopril 40 mg oral tablet: 1 tab(s) orally once a day  hydroCHLOROthiazide 25 mg oral tablet: 1 tab(s) orally once a day  labetalol 100 mg oral tablet: 1 tab(s) orally 2 times a day  meclizine 12.5 mg oral tablet: 1 tab(s) orally 3 times a day, As needed, Dizziness  omeprazole 20 mg oral delayed release capsule: 1 cap(s) orally once a day

## 2023-03-03 NOTE — PROGRESS NOTE ADULT - ASSESSMENT
89 yrs old female with pmhx of HTN, prediabetes presented with vertigo. CT head showed 2.6 x 2.1 cm calcified meningioma in anterior left parafalcine region, neuro Dr. Holloway consulted. Patient admitted  to rule out posterior cerebellar stroke/TIA, Neurology Dr. Holloway recommended MRI of brain and internal auditory canal. But can be followed outpatient.  Patient also noted to have HTN emergency and  elevated BNP. Chest x ray showed clear lungs with no evidence of pneumonia, pleural effusion, or CHF. Pending Echo.     BP on admission 195/89 , s/p hydralazine and labetalol, pt bp stable resume hydrochlorothiazide  If Echo is normal, pt can follow up outpatient neurology for calcified meningioma, neuro Dr. Holloway following.      
89 yrs old female with pmhx of HTN, prediabetes presented with vertigo. CT head showed 2.6 x 2.1 cm calcified meningioma in anterior left parafalcine region, neuro Dr. Holloway consulted. Patient admitted  to rule out posterior cerebellar stroke/TIA, Neurology Dr. Holloway recommended MRI of brain and internal auditory canal. But can be followed outpatient.  Patient also noted to have HTN emergency and  elevated BNP. Chest x ray showed clear lungs with no evidence of pneumonia, pleural effusion, or CHF. Pending Echo.     BP on admission 195/89 , s/p hydralazine and labetalol, pt bp stable resume hydrochlorothiazide  If Echo is normal, pt can follow up outpatient neurology for calcified meningioma, neuro Dr. Holloway following.

## 2023-03-03 NOTE — DISCHARGE NOTE PROVIDER - CARE PROVIDERS DIRECT ADDRESSES
[email or web address]

## 2023-03-03 NOTE — PROGRESS NOTE ADULT - SUBJECTIVE AND OBJECTIVE BOX
PGY-1 Progress Note discussed with attending    PAGER #: [[phone]] TILL 5:00 PM  PLEASE CONTACT ON CALL TEAM:  - On Call Team (Please refer to Lakshmi) FROM 5:00 PM - 8:30PM  - Nightfloat Team FROM 8:30 -7:30 AM    OVERNIGHT EVENTS:   - No acute overnight events. Pt seen at bedside, NAD, no acute complaints, reporting some right ear fullness. Denies fevers, chills, CP, SOB, N/V/D, abdominal pain, dysuria, numbness/tingling/weakness in extremities.     REVIEW OF SYSTEMS:  CONSTITUTIONAL: No fever, weight loss, or fatigue  RESPIRATORY: No cough, wheezing, chills or hemoptysis; No shortness of breath  CARDIOVASCULAR: No chest pain, palpitations, dizziness, or leg swelling  GASTROINTESTINAL: No abdominal pain. No nausea, vomiting, or hematemesis; No diarrhea or constipation. No melena or hematochezia.  GENITOURINARY: No dysuria or hematuria, urinary frequency  NEUROLOGICAL: No headaches, memory loss, loss of strength, numbness, or tremors  SKIN: No itching, burning, rashes, or lesions     MEDICATIONS  (STANDING):  aspirin  chewable 81 milliGRAM(s) Oral daily  atorvastatin 40 milliGRAM(s) Oral at bedtime  hydrochlorothiazide 25 milliGRAM(s) Oral daily  insulin lispro (ADMELOG) corrective regimen sliding scale   SubCutaneous every 4 hours    MEDICATIONS  (PRN):  meclizine 12.5 milliGRAM(s) Oral three times a day PRN Dizziness      Vital Signs Last 24 Hrs  T(C): 36.8 (03 Mar 2023 07:05), Max: 36.9 (02 Mar 2023 11:00)  T(F): 98.2 (03 Mar 2023 07:05), Max: 98.5 (02 Mar 2023 11:00)  HR: 96 (03 Mar 2023 07:05) (61 - 96)  BP: 118/79 (03 Mar 2023 07:05) (118/79 - 153/69)  BP(mean): --  RR: 18 (03 Mar 2023 07:05) (17 - 18)  SpO2: 100% (03 Mar 2023 07:05) (97% - 100%)    Parameters below as of 03 Mar 2023 07:05  Patient On (Oxygen Delivery Method): room air        PHYSICAL EXAMINATION:  GENERAL: NAD, AAOx  HEAD: AT/NC  EYES: conjunctiva and sclera clear  NECK: supple, No JVD noted, Normal thyroid  CHEST/LUNG: CTABL; no rales, rhonchi, wheezing, or rubs  HEART: regular rate and rhythm; no murmurs, rubs, or gallops  ABDOMEN: soft, nontender, nondistended; Bowel sounds present  EXTREMITIES:  2+ Peripheral Pulses, No clubbing, cyanosis, or edema  SKIN: warm dry                          9.9    5.80  )-----------( 124      ( 03 Mar 2023 05:16 )             29.8     03-03    143  |  115<H>  |  24<H>  ----------------------------<  92  3.6   |  21<L>  |  1.13    Ca    8.6      03 Mar 2023 05:16  Phos  2.7     03-02  Mg     1.7     03-02    TPro  6.2  /  Alb  2.9<L>  /  TBili  0.5  /  DBili  x   /  AST  20  /  ALT  16  /  AlkPhos  65  03-02    LIVER FUNCTIONS - ( 02 Mar 2023 04:50 )  Alb: 2.9 g/dL / Pro: 6.2 g/dL / ALK PHOS: 65 U/L / ALT: 16 U/L DA / AST: 20 U/L / GGT: x           CARDIAC MARKERS ( 01 Mar 2023 17:51 )  x     / x     / 150 U/L / x     / x          PT/INR - ( 01 Mar 2023 17:51 )   PT: 14.2 sec;   INR: 1.19 ratio         PTT - ( 01 Mar 2023 17:51 )  PTT:37.4 sec  COVID-19 PCR: NotDetec (02 Mar 2023 02:29)      CAPILLARY BLOOD GLUCOSE      POCT Blood Glucose.: 86 mg/dL (03 Mar 2023 07:56)  POCT Blood Glucose.: 92 mg/dL (03 Mar 2023 05:46)  POCT Blood Glucose.: 115 mg/dL (02 Mar 2023 21:05)  POCT Blood Glucose.: 124 mg/dL (02 Mar 2023 18:11)  POCT Blood Glucose.: 104 mg/dL (02 Mar 2023 11:26)      RADIOLOGY & ADDITIONAL TESTS:                   PGY-1 Progress Note discussed with attending    PAGER #: [[phone]] TILL 5:00 PM  PLEASE CONTACT ON CALL TEAM:  - On Call Team (Please refer to Lakshmi) FROM 5:00 PM - 8:30PM  - Nightfloat Team FROM 8:30 -7:30 AM    OVERNIGHT EVENTS:   - No acute overnight events. Pt seen at bedside, NAD, no acute complaints, reporting some right ear fullness. Denies fevers, chills, CP, SOB, N/V/D, abdominal pain, dysuria, numbness/tingling/weakness in extremities.     REVIEW OF SYSTEMS:  CONSTITUTIONAL: No fever, weight loss, or fatigue  RESPIRATORY: No cough, wheezing, chills or hemoptysis; No shortness of breath  CARDIOVASCULAR: No chest pain, palpitations, dizziness, or leg swelling  GASTROINTESTINAL: No abdominal pain. No nausea, vomiting, or hematemesis; No diarrhea or constipation. No melena or hematochezia.  GENITOURINARY: No dysuria or hematuria, urinary frequency  NEUROLOGICAL: No headaches, memory loss, loss of strength, numbness, or tremors  SKIN: No itching, burning, rashes, or lesions     MEDICATIONS  (STANDING):  aspirin  chewable 81 milliGRAM(s) Oral daily  atorvastatin 40 milliGRAM(s) Oral at bedtime  hydrochlorothiazide 25 milliGRAM(s) Oral daily  insulin lispro (ADMELOG) corrective regimen sliding scale   SubCutaneous every 4 hours    MEDICATIONS  (PRN):  meclizine 12.5 milliGRAM(s) Oral three times a day PRN Dizziness      Vital Signs Last 24 Hrs  T(C): 36.8 (03 Mar 2023 07:05), Max: 36.9 (02 Mar 2023 11:00)  T(F): 98.2 (03 Mar 2023 07:05), Max: 98.5 (02 Mar 2023 11:00)  HR: 96 (03 Mar 2023 07:05) (61 - 96)  BP: 118/79 (03 Mar 2023 07:05) (118/79 - 153/69)  BP(mean): --  RR: 18 (03 Mar 2023 07:05) (17 - 18)  SpO2: 100% (03 Mar 2023 07:05) (97% - 100%)    Parameters below as of 03 Mar 2023 07:05  Patient On (Oxygen Delivery Method): room air        PHYSICAL EXAMINATION:  GENERAL: NAD, AAOx3, elderly  HEAD: AT/NC  EYES: conjunctiva and sclera clear  NECK: supple, No JVD noted, Normal thyroid  CHEST/LUNG: CTABL; no rales, rhonchi, wheezing, or rubs  HEART: regular rate and rhythm; no murmurs, rubs, or gallops  ABDOMEN: soft, nontender, nondistended; Bowel sounds present  EXTREMITIES: 5/5 strength b/l UE and LEs  2+ Peripheral Pulses, No clubbing, cyanosis, or edema  SKIN: warm dry                          9.9    5.80  )-----------( 124      ( 03 Mar 2023 05:16 )             29.8     03-03    143  |  115<H>  |  24<H>  ----------------------------<  92  3.6   |  21<L>  |  1.13    Ca    8.6      03 Mar 2023 05:16  Phos  2.7     03-02  Mg     1.7     03-02    TPro  6.2  /  Alb  2.9<L>  /  TBili  0.5  /  DBili  x   /  AST  20  /  ALT  16  /  AlkPhos  65  03-02    LIVER FUNCTIONS - ( 02 Mar 2023 04:50 )  Alb: 2.9 g/dL / Pro: 6.2 g/dL / ALK PHOS: 65 U/L / ALT: 16 U/L DA / AST: 20 U/L / GGT: x           CARDIAC MARKERS ( 01 Mar 2023 17:51 )  x     / x     / 150 U/L / x     / x          PT/INR - ( 01 Mar 2023 17:51 )   PT: 14.2 sec;   INR: 1.19 ratio         PTT - ( 01 Mar 2023 17:51 )  PTT:37.4 sec  COVID-19 PCR: NotDetec (02 Mar 2023 02:29)      CAPILLARY BLOOD GLUCOSE      POCT Blood Glucose.: 86 mg/dL (03 Mar 2023 07:56)  POCT Blood Glucose.: 92 mg/dL (03 Mar 2023 05:46)  POCT Blood Glucose.: 115 mg/dL (02 Mar 2023 21:05)  POCT Blood Glucose.: 124 mg/dL (02 Mar 2023 18:11)  POCT Blood Glucose.: 104 mg/dL (02 Mar 2023 11:26)      RADIOLOGY & ADDITIONAL TESTS:                   PGY-1 Progress Note discussed with attending    PAGER #: [[phone]] TILL 5:00 PM  PLEASE CONTACT ON CALL TEAM:  - On Call Team (Please refer to Lakshmi) FROM 5:00 PM - 8:30PM  - Nightfloat Team FROM 8:30 -7:30 AM    OVERNIGHT EVENTS:   - No acute overnight events. Pt seen at bedside, NAD, no acute complaints, reporting some right ear fullness. Denies fevers, chills, CP, SOB, N/V/D, abdominal pain, dysuria, numbness/tingling/weakness in extremities.     REVIEW OF SYSTEMS:  CONSTITUTIONAL: (+) right ear fullness. No fever, weight loss, or fatigue  RESPIRATORY: No cough, wheezing, chills or hemoptysis; No shortness of breath  CARDIOVASCULAR: No chest pain, palpitations, dizziness, or leg swelling  GASTROINTESTINAL: No abdominal pain. No nausea, vomiting, or hematemesis; No diarrhea or constipation. No melena or hematochezia.  GENITOURINARY: No dysuria or hematuria, urinary frequency  NEUROLOGICAL: No headaches, memory loss, loss of strength, numbness, or tremors  SKIN: No itching, burning, rashes, or lesions     MEDICATIONS  (STANDING):  aspirin  chewable 81 milliGRAM(s) Oral daily  atorvastatin 40 milliGRAM(s) Oral at bedtime  hydrochlorothiazide 25 milliGRAM(s) Oral daily  insulin lispro (ADMELOG) corrective regimen sliding scale   SubCutaneous every 4 hours    MEDICATIONS  (PRN):  meclizine 12.5 milliGRAM(s) Oral three times a day PRN Dizziness      Vital Signs Last 24 Hrs  T(C): 36.8 (03 Mar 2023 07:05), Max: 36.9 (02 Mar 2023 11:00)  T(F): 98.2 (03 Mar 2023 07:05), Max: 98.5 (02 Mar 2023 11:00)  HR: 96 (03 Mar 2023 07:05) (61 - 96)  BP: 118/79 (03 Mar 2023 07:05) (118/79 - 153/69)  BP(mean): --  RR: 18 (03 Mar 2023 07:05) (17 - 18)  SpO2: 100% (03 Mar 2023 07:05) (97% - 100%)    Parameters below as of 03 Mar 2023 07:05  Patient On (Oxygen Delivery Method): room air        PHYSICAL EXAMINATION:  GENERAL: NAD, AAOx3, elderly  HEAD: AT/NC  EYES: conjunctiva and sclera clear  NECK: supple, No JVD noted, Normal thyroid  CHEST/LUNG: CTABL; no rales, rhonchi, wheezing, or rubs  HEART: regular rate and rhythm; no murmurs, rubs, or gallops  ABDOMEN: soft, nontender, nondistended; Bowel sounds present  EXTREMITIES: 5/5 strength b/l UE and LEs  2+ Peripheral Pulses, No clubbing, cyanosis, or edema  SKIN: warm dry                          9.9    5.80  )-----------( 124      ( 03 Mar 2023 05:16 )             29.8     03-03    143  |  115<H>  |  24<H>  ----------------------------<  92  3.6   |  21<L>  |  1.13    Ca    8.6      03 Mar 2023 05:16  Phos  2.7     03-02  Mg     1.7     03-02    TPro  6.2  /  Alb  2.9<L>  /  TBili  0.5  /  DBili  x   /  AST  20  /  ALT  16  /  AlkPhos  65  03-02    LIVER FUNCTIONS - ( 02 Mar 2023 04:50 )  Alb: 2.9 g/dL / Pro: 6.2 g/dL / ALK PHOS: 65 U/L / ALT: 16 U/L DA / AST: 20 U/L / GGT: x           CARDIAC MARKERS ( 01 Mar 2023 17:51 )  x     / x     / 150 U/L / x     / x          PT/INR - ( 01 Mar 2023 17:51 )   PT: 14.2 sec;   INR: 1.19 ratio         PTT - ( 01 Mar 2023 17:51 )  PTT:37.4 sec  COVID-19 PCR: NotDetec (02 Mar 2023 02:29)      CAPILLARY BLOOD GLUCOSE      POCT Blood Glucose.: 86 mg/dL (03 Mar 2023 07:56)  POCT Blood Glucose.: 92 mg/dL (03 Mar 2023 05:46)  POCT Blood Glucose.: 115 mg/dL (02 Mar 2023 21:05)  POCT Blood Glucose.: 124 mg/dL (02 Mar 2023 18:11)  POCT Blood Glucose.: 104 mg/dL (02 Mar 2023 11:26)      RADIOLOGY & ADDITIONAL TESTS:

## 2023-03-03 NOTE — PROGRESS NOTE ADULT - PROBLEM SELECTOR PLAN 3
prerenal 2/2 to dehydration vs   p/w SCr of 1.49, unknown baseline  likely pre-renal   Monitor SCr in the setting of overcorrection of BP   IVF NS 60 ml/12hrs
prerenal 2/2 to dehydration vs   p/w SCr of 1.49, unknown baseline  likely pre-renal   Monitor SCr in the setting of overcorrection of BP   IVF NS 60 ml/12hrs

## 2023-03-03 NOTE — PHYSICAL THERAPY INITIAL EVALUATION ADULT - PERTINENT HX OF CURRENT PROBLEM, REHAB EVAL
Pt. admitted due to dizziness x 1 day. Head MRI showed NO CURRENT EVIDENCE OF ACUTE ISCHEMIA.  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 NO   EVIDENCE OF ADJACENT EDEMA.  NO EVIDENCE OF AN IAC / CP ANGLE MASS OR ABNORMAL ENHANCEMENT.  OPACIFIED LEFT FRONTAL SINUS AND ANTERIOR LEFT ETHMOID AIR CELLS

## 2023-03-03 NOTE — PROGRESS NOTE ADULT - PROBLEM SELECTOR PLAN 2
BP of 195/ 69, on admission   s/p hydralazine and labetalol   hold labetalol in setting of hypotension and vertigo   resume hctz   Trops neg, no chest pain   BNP elevated, echo pending   BP today 118/79 (3/3) BP of 195/ 69, on admission   s/p hydralazine and labetalol   hold labetalol in setting of hypotension and vertigo   resume home meds  Trops neg, no chest pain   BNP elevated, echo pending   BP today 164/93 (3/3)

## 2023-03-03 NOTE — PROGRESS NOTE ADULT - TIME BILLING
I counseled the patient, granddaughter at bedside, and primary team about the patient's likely diagnosis of peripheral vertigo, as well as the medication to use as needed to treat dizziness.

## 2023-03-03 NOTE — PROGRESS NOTE ADULT - SUBJECTIVE AND OBJECTIVE BOX
NEUROLOGY FOLLOW-UP NOTE    NAME:  GENE RANDLE      ASSESSMENT:  89 year old F with PMH of HTN, Pre-DM presents with acute onset dizziness, now resolved, likely secondary to peripheral vertigo but without evidence for ischemic stroke        RECOMMENDATIONS:    1. Stroke workup  - No acute abnormalities seen on MRI Brain/IAC w/wo Gadolinium - No additional neuroimaging indicated at this time  - Telemetry monitoring while inpatient      2. Secondary stroke prevention  - Q4H Neurochecks & Vital signs  - No role for Aspirin or Atorvastatin unless they are needed for non-neurological reasons  - No role for permissive HTN given NIHSS 0; treat if over 140/90 (goal /80)  - Diabetes management as per primary team  - PT/OT  - DVT ppx: SCDs, Enoxaparin or Heparin      3. R/o peripheral neuropathy  - Vitamin B12, Folate, and TSH levels are normal - No indication for supplementation        NOTE TO BE COMPLETED - PLEASE REFER TO ABOVE ONLY AND IGNORE INFORMATION BELOW    ******************************    HPI:  89 yrs old female from home, AAOx3 at baseline, ambulating with walker with pmhx of HTN, prediabetes presented with dizziness of one day duration. Hx taken from both patient and daughter in-law present at bedside. Pt reported of dizziness upon waking up in the morning, described mainly as being out of balance. She denied any fall, LOC, seizure, headache, chest pain, nausea/vomiting, however  had bilateral lower extremity weakness (both legs felt sleepy). Pt denied any sensory or motor deficit, slurred speech or facial droop. she reported of having shortness of breath which started in the past couple of weeks mainly on exertion without any chest pain. Pt denied any other symptoms such as abdominal pain, diarrhea, constipation, urinary symptoms. Pt lives with her grand-daughter, ambulates using walker for the past year, walks mainly at home, Pt has lost one of her daughters in the past few months due to cancer.  (02 Mar 2023 03:01)      NEURO HPI:      INTERVAL HISTORY:      MEDICATIONS:  aspirin  chewable 81 milliGRAM(s) Oral daily  atorvastatin 40 milliGRAM(s) Oral at bedtime  hydrochlorothiazide 25 milliGRAM(s) Oral daily  insulin lispro (ADMELOG) corrective regimen sliding scale   SubCutaneous every 4 hours  labetalol 100 milliGRAM(s) Oral every 12 hours  lisinopril 40 milliGRAM(s) Oral daily  meclizine 12.5 milliGRAM(s) Oral three times a day PRN      ALLERGIES:  No Known Allergies      REVIEW OF SYSTEMS:  Fourteen systems reviewed and negative except as in HPI / Interval History.          OBJECTIVE:  Vital Signs Last 24 Hrs  T(C): 36.6 (03 Mar 2023 16:15), Max: 36.9 (03 Mar 2023 11:36)  T(F): 97.9 (03 Mar 2023 16:15), Max: 98.4 (03 Mar 2023 11:36)  HR: 77 (03 Mar 2023 16:15) (68 - 78)  BP: 154/81 (03 Mar 2023 16:15) (143/61 - 169/72)  BP(mean): 98 (03 Mar 2023 15:29) (98 - 98)  RR: 17 (03 Mar 2023 16:15) (17 - 18)  SpO2: 99% (03 Mar 2023 16:15) (96% - 99%)    Parameters below as of 03 Mar 2023 16:15  Patient On (Oxygen Delivery Method): room air        General Examination:  General: No acute distress  HEENT: Atraumatic, Normocephalic  Respiratory: CTA B/l.  No crackles, rhonchi, or wheezes.  Cardiovascular: RRR.  Normal S1 & S2.  Normal b/l radial and pedal pulses.    Neurological Examination:  General / Mental Status: AAO x 3.  No aphasia or dysarthria.  Naming and repetition intact.  Cranial Nerves: VFF x 4.  PERRL.  EOMI x 2, No nystagmus or diplopia.  B/l V1-V3 equal and intact to light touch and pinprick.  Symmetric facial movement and palate elevation.  B/l hearing equal to finger rub.  5/5 strength with b/l sternocleidomastoid & trapezius.  Midline tongue protrusion, with no atrophy or fasciculations.  Motor: Normal bulk & tone in all four extremities.  5/5 strength throughout all four extremities.  No downward drift, rigidity, spasticity, or tremors in any of the four extremities.  Sensory: Intact to light touch and pinprick in all four extremities.  Negative Romberg.  Reflex: 2+ and symmetric at b/l biceps, triceps, brachioradialis, patellae, and ankles.  Downgoing toes b/l.  Coordination: No dysmetria with b/l finger-to-nose and heel raise tests.  Symmetric rapid alternating movements b/l.  Gait: Normal, narrow-based gait.  No difficulty with tiptoe, heel, and tandem gaits.        LABORATORY VALUES:                          9.9    5.80  )-----------( 124      ( 03 Mar 2023 05:16 )             29.8       03-03    143  |  115<H>  |  24<H>  ----------------------------<  92  3.6   |  21<L>  |  1.13    Ca    8.6      03 Mar 2023 05:16  Phos  2.7     03-02  Mg     1.7     03-02    TPro  6.2  /  Alb  2.9<L>  /  TBili  0.5  /  DBili  x   /  AST  20  /  ALT  16  /  AlkPhos  65  03-02 03-02 Chol 169 LDL -- HDL 41<L> Trig 177<H>    Glucose Trend  03-03-23 @ 16:48   -  -- -- 112<H>  03-03-23 @ 12:06   -  -- -- 156<H>  03-03-23 @ 07:56   -  -- -- 86  03-03-23 @ 05:46   -  -- -- 92  03-03-23 @ 05:16   -  -- 92 --  03-02-23 @ 21:05   -  -- -- 115<H>  03-02-23 @ 18:11   -  -- -- 124<H>  03-02-23 @ 11:26   -  -- -- 104<H>  03-02-23 @ 07:21   -  -- -- 102<H>  03-02-23 @ 04:50   -  -- 109<H> --    Vitamin B12, Serum: 400 pg/mL (03-03-23 @ 05:16)  Folate, Serum: 8.9 ng/mL (03-03-23 @ 05:16)          NEUROIMAGING:          Please contact the Neurology consult service with any neurological questions.      Cricket Holloway MD   of Neurology  [facility]         NEUROLOGY FOLLOW-UP NOTE    NAME:  GENE RANDLE      ASSESSMENT:  89 year old RHF with PMH of HTN, Pre-DM presents with acute onset dizziness, now resolved, likely secondary to peripheral vertigo but without evidence for ischemic stroke        RECOMMENDATIONS:    1. Stroke workup  - No acute abnormalities seen on MRI Brain/IAC w/wo Gadolinium - No additional neuroimaging indicated at this time  - Telemetry monitoring while inpatient      2. Secondary stroke prevention  - Q4H Neurochecks & Vital signs  - No role for Aspirin or Atorvastatin unless they are needed for non-neurological reasons  - No role for permissive HTN given NIHSS 0; treat if over 140/90 (goal /80)  - Prediabetes management as per primary team  - Okay to use Meclizine as needed to treat dizziness  - PT/OT  - DVT ppx: SCDs, Enoxaparin or Heparin      3. R/o peripheral neuropathy  - Vitamin B12, Folate, and TSH levels are normal - No indication for supplementation          ******************************    HPI:  89 yrs old female from home, AAOx3 at baseline, ambulating with walker with pmhx of HTN, prediabetes presented with dizziness of one day duration. Hx taken from both patient and daughter in-law present at bedside. Pt reported of dizziness upon waking up in the morning, described mainly as being out of balance. She denied any fall, LOC, seizure, headache, chest pain, nausea/vomiting, however had bilateral lower extremity weakness (both legs felt sleepy). Pt denied any sensory or motor deficit, slurred speech or facial droop. she reported of having shortness of breath which started in the past couple of weeks mainly on exertion without any chest pain. Pt denied any other symptoms such as abdominal pain, diarrhea, constipation, urinary symptoms. Pt lives with her grand-daughter, ambulates using walker for the past year, walks mainly at home, Pt has lost one of her daughters in the past few months due to cancer.  (02 Mar 2023 03:01)      NEURO HPI:  Patient seen with granddaughter at bedside. Reports that she has been having head spinning associated with dizziness since last night. Reports leg cramping which was limited to last night and now resolved. Also mentions history of neuropathy and tried gabapentin but had dizziness in the past. She denies lightheadedness, any headache, focal weakness, slurred speech, facial droop.      INTERVAL HISTORY:  The patient has not had any new episodes of dizziness or head-spinning sensation since admission.      MEDICATIONS:  aspirin  chewable 81 milliGRAM(s) Oral daily  atorvastatin 40 milliGRAM(s) Oral at bedtime  hydrochlorothiazide 25 milliGRAM(s) Oral daily  insulin lispro (ADMELOG) corrective regimen sliding scale   SubCutaneous every 4 hours  labetalol 100 milliGRAM(s) Oral every 12 hours  lisinopril 40 milliGRAM(s) Oral daily  meclizine 12.5 milliGRAM(s) Oral three times a day PRN      ALLERGIES:  No Known Allergies      REVIEW OF SYSTEMS:  Fourteen systems reviewed and negative except as in HPI / Interval History.          OBJECTIVE:  Vital Signs Last 24 Hrs  T(C): 36.6 (03 Mar 2023 16:15), Max: 36.9 (03 Mar 2023 11:36)  T(F): 97.9 (03 Mar 2023 16:15), Max: 98.4 (03 Mar 2023 11:36)  HR: 77 (03 Mar 2023 16:15) (68 - 78)  BP: 154/81 (03 Mar 2023 16:15) (143/61 - 169/72)  BP(mean): 98 (03 Mar 2023 15:29) (98 - 98)  RR: 17 (03 Mar 2023 16:15) (17 - 18)  SpO2: 99% (03 Mar 2023 16:15) (96% - 99%)  Parameters below as of 03 Mar 2023 16:15  Patient On (Oxygen Delivery Method): room air      General Examination:  General: No acute distress  HEENT: Atraumatic, Normocephalic  Respiratory: CTA B/l.  No crackles, rhonchi, or wheezes.  Cardiovascular: RRR.  Normal S1 & S2.  Normal b/l radial and pedal pulses.    Neurological Examination:  General / Mental Status: AAO x 3.  No aphasia or dysarthria.  Naming and repetition intact.  Cranial Nerves: VFF x 4.  PERRL.  EOMI x 2, No nystagmus or diplopia.  B/l V1-V3 equal and intact to light touch and pinprick.  Symmetric facial movement and palate elevation.  B/l hearing equal to finger rub.  5/5 strength with b/l sternocleidomastoid & trapezius.  Midline tongue protrusion, with no atrophy or fasciculations.  Motor: Normal bulk & tone in all four extremities.  5/5 strength throughout all four extremities.  No downward drift, rigidity, spasticity, or tremors in any of the four extremities.  Sensory: Intact to light touch and pinprick in all four extremities.  Reflex: 2+ and symmetric at b/l biceps, triceps, brachioradialis, patellae, and ankles.  Downgoing toes b/l.  Coordination: No dysmetria with b/l finger-to-nose and heel raise tests.  Gait and Romberg sign testing deferred per patient preference.    NIHSS 0 - No IV TNK because of NIHSS 0 and patient presenting outside the time window  MRS 1          LABORATORY VALUES:                          9.9    5.80  )-----------( 124      ( 03 Mar 2023 05:16 )             29.8       03-03    143  |  115<H>  |  24<H>  ----------------------------<  92  3.6   |  21<L>  |  1.13    Ca    8.6      03 Mar 2023 05:16  Phos  2.7     03-02  Mg     1.7     03-02    TPro  6.2  /  Alb  2.9<L>  /  TBili  0.5  /  DBili  x   /  AST  20  /  ALT  16  /  AlkPhos  65  03-02    03-02 Chol 169 LDL 93 HDL 41<L> Trig 177<H>    Glucose Trend  03-03-23 @ 16:48   -  -- -- 112<H>  03-03-23 @ 12:06   -  -- -- 156<H>  03-03-23 @ 07:56   -  -- -- 86  03-03-23 @ 05:46   -  -- -- 92  03-03-23 @ 05:16   -  -- 92 --  03-02-23 @ 21:05   -  -- -- 115<H>  03-02-23 @ 18:11   -  -- -- 124<H>  03-02-23 @ 11:26   -  -- -- 104<H>  03-02-23 @ 07:21   -  -- -- 102<H>  03-02-23 @ 04:50   -  -- 109<H> --    A1C with Estimated Average Glucose (03.02.23 @ 04:50)   A1C with Estimated Average Glucose Result: 5.8%   Estimated Average Glucose: 120 mg/dL       Vitamin B12, Serum: 400 pg/mL (03-03-23 @ 05:16)  Folate, Serum: 8.9 ng/mL (03-03-23 @ 05:16)    Thyroid Stimulating Hormone, Serum: 6.67 uU/mL (03.03.23 @ 05:16)   Thyroid Stimulating Hormone, Serum: 4.07 uU/mL (03.02.23 @ 04:50)         NEUROIMAGING:      CT Head (3/1/23):  - No acute intracranial abnormality  - Anterior left parafalcine meningioma  - Left frontal and anterior left ethmoid extensive mucosal thickening      MRI Brain/IAC w/wo Gadolinium (3/3/23):  - No acute intracranial abnormality or abnormal enhancement  - Incidental left frontal sinus and left ethmoid opacification          Please contact the Neurology consult service with any neurological questions.      Cricket Holloway MD   of Neurology  [facility]

## 2023-03-21 NOTE — ED PROVIDER NOTE - CHILD ABUSE FACILITY
H Elidel Counseling: Patient may experience a mild burning sensation during topical application. Elidel is not approved in children less than 2 years of age. There have been case reports of hematologic and skin malignancies in patients using topical calcineurin inhibitors although causality is questionable.

## 2023-07-13 VITALS
TEMPERATURE: 98 F | DIASTOLIC BLOOD PRESSURE: 73 MMHG | OXYGEN SATURATION: 99 % | RESPIRATION RATE: 18 BRPM | SYSTOLIC BLOOD PRESSURE: 148 MMHG | HEART RATE: 81 BPM

## 2023-07-13 LAB
ALBUMIN SERPL ELPH-MCNC: 3.8 G/DL — SIGNIFICANT CHANGE UP (ref 3.3–5)
ALP SERPL-CCNC: 81 U/L — SIGNIFICANT CHANGE UP (ref 40–120)
ALT FLD-CCNC: 16 U/L — SIGNIFICANT CHANGE UP (ref 4–33)
ANION GAP SERPL CALC-SCNC: 15 MMOL/L — HIGH (ref 7–14)
APTT BLD: 28.4 SEC — SIGNIFICANT CHANGE UP (ref 27–36.3)
AST SERPL-CCNC: 33 U/L — HIGH (ref 4–32)
BASOPHILS # BLD AUTO: 0.04 K/UL — SIGNIFICANT CHANGE UP (ref 0–0.2)
BASOPHILS NFR BLD AUTO: 0.8 % — SIGNIFICANT CHANGE UP (ref 0–2)
BILIRUB SERPL-MCNC: 0.6 MG/DL — SIGNIFICANT CHANGE UP (ref 0.2–1.2)
BLOOD GAS VENOUS COMPREHENSIVE RESULT: SIGNIFICANT CHANGE UP
BUN SERPL-MCNC: 28 MG/DL — HIGH (ref 7–23)
CALCIUM SERPL-MCNC: 9.3 MG/DL — SIGNIFICANT CHANGE UP (ref 8.4–10.5)
CHLORIDE SERPL-SCNC: 106 MMOL/L — SIGNIFICANT CHANGE UP (ref 98–107)
CO2 SERPL-SCNC: 21 MMOL/L — LOW (ref 22–31)
CREAT SERPL-MCNC: 1.29 MG/DL — SIGNIFICANT CHANGE UP (ref 0.5–1.3)
D DIMER BLD IA.RAPID-MCNC: 207 NG/ML DDU — SIGNIFICANT CHANGE UP
EGFR: 38 ML/MIN/1.73M2 — LOW
EOSINOPHIL # BLD AUTO: 0.1 K/UL — SIGNIFICANT CHANGE UP (ref 0–0.5)
EOSINOPHIL NFR BLD AUTO: 2.1 % — SIGNIFICANT CHANGE UP (ref 0–6)
GLUCOSE SERPL-MCNC: 142 MG/DL — HIGH (ref 70–99)
HCT VFR BLD CALC: 34.9 % — SIGNIFICANT CHANGE UP (ref 34.5–45)
HGB BLD-MCNC: 11.5 G/DL — SIGNIFICANT CHANGE UP (ref 11.5–15.5)
IANC: 3.2 K/UL — SIGNIFICANT CHANGE UP (ref 1.8–7.4)
IMM GRANULOCYTES NFR BLD AUTO: 0.2 % — SIGNIFICANT CHANGE UP (ref 0–0.9)
INR BLD: 1.13 RATIO — SIGNIFICANT CHANGE UP (ref 0.88–1.16)
LYMPHOCYTES # BLD AUTO: 1.07 K/UL — SIGNIFICANT CHANGE UP (ref 1–3.3)
LYMPHOCYTES # BLD AUTO: 22.2 % — SIGNIFICANT CHANGE UP (ref 13–44)
MAGNESIUM SERPL-MCNC: 1.6 MG/DL — SIGNIFICANT CHANGE UP (ref 1.6–2.6)
MCHC RBC-ENTMCNC: 29.3 PG — SIGNIFICANT CHANGE UP (ref 27–34)
MCHC RBC-ENTMCNC: 33 GM/DL — SIGNIFICANT CHANGE UP (ref 32–36)
MCV RBC AUTO: 89 FL — SIGNIFICANT CHANGE UP (ref 80–100)
MONOCYTES # BLD AUTO: 0.41 K/UL — SIGNIFICANT CHANGE UP (ref 0–0.9)
MONOCYTES NFR BLD AUTO: 8.5 % — SIGNIFICANT CHANGE UP (ref 2–14)
NEUTROPHILS # BLD AUTO: 3.2 K/UL — SIGNIFICANT CHANGE UP (ref 1.8–7.4)
NEUTROPHILS NFR BLD AUTO: 66.2 % — SIGNIFICANT CHANGE UP (ref 43–77)
NRBC # BLD: 0 /100 WBCS — SIGNIFICANT CHANGE UP (ref 0–0)
NRBC # FLD: 0 K/UL — SIGNIFICANT CHANGE UP (ref 0–0)
NT-PROBNP SERPL-SCNC: 676 PG/ML — HIGH
PLATELET # BLD AUTO: 147 K/UL — LOW (ref 150–400)
POTASSIUM SERPL-MCNC: 3.7 MMOL/L — SIGNIFICANT CHANGE UP (ref 3.5–5.3)
POTASSIUM SERPL-SCNC: 3.7 MMOL/L — SIGNIFICANT CHANGE UP (ref 3.5–5.3)
PROT SERPL-MCNC: 6.3 G/DL — SIGNIFICANT CHANGE UP (ref 6–8.3)
PROTHROM AB SERPL-ACNC: 13.1 SEC — SIGNIFICANT CHANGE UP (ref 10.5–13.4)
RBC # BLD: 3.92 M/UL — SIGNIFICANT CHANGE UP (ref 3.8–5.2)
RBC # FLD: 13.2 % — SIGNIFICANT CHANGE UP (ref 10.3–14.5)
SODIUM SERPL-SCNC: 142 MMOL/L — SIGNIFICANT CHANGE UP (ref 135–145)
TROPONIN T, HIGH SENSITIVITY RESULT: 120 NG/L — CRITICAL HIGH
WBC # BLD: 4.83 K/UL — SIGNIFICANT CHANGE UP (ref 3.8–10.5)
WBC # FLD AUTO: 4.83 K/UL — SIGNIFICANT CHANGE UP (ref 3.8–10.5)

## 2023-07-13 PROCEDURE — 71045 X-RAY EXAM CHEST 1 VIEW: CPT | Mod: 26

## 2023-07-13 RX ORDER — ASPIRIN/CALCIUM CARB/MAGNESIUM 324 MG
162 TABLET ORAL ONCE
Refills: 0 | Status: COMPLETED | OUTPATIENT
Start: 2023-07-13 | End: 2023-07-13

## 2023-07-13 RX ADMIN — Medication 162 MILLIGRAM(S): at 21:45

## 2023-07-13 NOTE — ED ADULT TRIAGE NOTE - CHIEF COMPLAINT QUOTE
pt brought in by ambulance from home, c/o mid to right side chest pain that radiates to back since 4 pm today. states pain is worse with deep inspiration. also, endorses SOB.   hx- htn, hld, arthritis

## 2023-07-13 NOTE — ED PROVIDER NOTE - NS ED MD DISPO ISOLATION TYPES
Plastic Surgeon Procedure Text (A): After obtaining clear surgical margins the patient was sent to plastics for surgical repair.  The patient understands they will receive post-surgical care and follow-up from the referring physician's office. None

## 2023-07-13 NOTE — ED PROVIDER NOTE - PROGRESS NOTE DETAILS
chris aguirre ms4: s/p ASA patient indicates chest pain has improved but still present however much less intense. Macey Garza DO (PGY3): Patient signed out to me by day team.  Patient troponin found to be elevated at 120.  Patient's ECG showing old left bundle branch block.  Repeat EKG without changes.  Spoke with cards, will evaluate in the emergency department.  Repeat troponin now 182.   Pending cardiology recs. Macey Garza DO (PGY3): Patient started on heparin bolus and infusion.  Patient also loaded with Plavix as per cardiology recs.  Patient to be admitted to hospitalist.

## 2023-07-13 NOTE — ED PROVIDER NOTE - CLINICAL SUMMARY MEDICAL DECISION MAKING FREE TEXT BOX
Shilo PGY3: 89yo F with PMH of HTN HLD, known LBBB presents for CP, SOB with radiation to shoulder. EKG LBBB no acute ischemic changes. Lungs CTA. Check labs, CXR, trop/BNP. No clear extraneous risks for PE. Shilo PGY3: 89yo F with PMH of HTN HLD, known LBBB presents for CP, SOB with radiation to shoulder. EKG LBBB no acute ischemic changes. Lungs CTA. Check labs, CXR, trop/BNP. No clear extraneous risks for PE.

## 2023-07-13 NOTE — ED PROVIDER NOTE - PHYSICAL EXAMINATION
CONSTITUTIONAL: NAD  SKIN: Warm dry  HEAD: NCAT  EYES: NL inspection  ENT: MMM  CARD: RRR  RESP: CTAB  ABD: S/NT no R/G  EXT: no pedal edema  NEURO: Grossly non-focal   PSYCH: Cooperative, appropriate.

## 2023-07-13 NOTE — ED PROVIDER NOTE - OBJECTIVE STATEMENT
89yo F with PMH of HTN HLD, known LBBB presents for CP. Since 4pm chest tightness, mid sternal, radiates to R shoulder w/ SOB, no diaphoresis. Occured while walking outside, went home to rest but sxs persistent so came to ED. Still feels some aching in chest. No hx of CAD, no travel/prolonged mobility. Does walk at home, no hx of blood clots. On lasix for ankle edema. No cath/cardiologist hx. No abd pain, NVDC, HA.    Offered  but granddaughter at bedside who is ICU nurse agreeable to translate

## 2023-07-13 NOTE — ED ADULT TRIAGE NOTE - NS ED NURSE AMBULANCES
[facility]

## 2023-07-13 NOTE — ED PROVIDER NOTE - INTERPRETATION SERVICES DECLINED
Post-Care Instructions: I reviewed with the patient in detail post-care instructions. Patient is to keep the treatment areaas dry overnight, and then apply bacitracin twice daily until healed. Patient may apply hydrogen peroxide soaks to remove any crusting. Price (Use Numbers Only, No Special Characters Or $): 150 Acne Type: Milial cysts Detail Level: Simple Render Number Of Lesions Treated: yes Consent was obtained and risks were reviewed including but not limited to scarring, infection, bleeding, scabbing, incomplete removal, and allergy to anesthesia. Extraction Method: 18 gauge needle and comedo extractor Render Post-Care Instructions In Note?: no Prep Text (Optional): Prior to removal the treatment areas were prepped in the usual fashion. Patient/Caregiver requests family/friend to interpret.

## 2023-07-13 NOTE — ED ADULT NURSE NOTE - NSFALLHARMRISKINTERV_ED_ALL_ED
Communicate risk of Fall with Harm to all staff, patient, and family/Provide visual cue: red socks, yellow wristband, yellow gown, etc/Reinforce activity limits and safety measures with patient and family/Bed in lowest position, wheels locked, appropriate side rails in place/Call bell, personal items and telephone in reach/Instruct patient to call for assistance before getting out of bed/chair/stretcher/Non-slip footwear applied when patient is off stretcher/[place] to call system/Physically safe environment - no spills, clutter or unnecessary equipment/Purposeful Proactive Rounding/Room/bathroom lighting operational, light cord in reach Communicate risk of Fall with Harm to all staff, patient, and family/Provide visual cue: red socks, yellow wristband, yellow gown, etc/Reinforce activity limits and safety measures with patient and family/Bed in lowest position, wheels locked, appropriate side rails in place/Call bell, personal items and telephone in reach/Instruct patient to call for assistance before getting out of bed/chair/stretcher/Non-slip footwear applied when patient is off stretcher/[place] to call system/Physically safe environment - no spills, clutter or unnecessary equipment/Purposeful Proactive Rounding/Room/bathroom lighting operational, light cord in reach Communicate risk of Fall with Harm to all staff, patient, and family/Provide visual cue: red socks, yellow wristband, yellow gown, etc/Reinforce activity limits and safety measures with patient and family/Bed in lowest position, wheels locked, appropriate side rails in place/Call bell, personal items and telephone in reach/Instruct patient to call for assistance before getting out of bed/chair/stretcher/Non-slip footwear applied when patient is off stretcher/[place] to call system/Physically safe environment - no spills, clutter or unnecessary equipment/Purposeful Proactive Rounding/Room/bathroom lighting operational, light cord in reach

## 2023-07-13 NOTE — ED PROVIDER NOTE - ATTENDING CONTRIBUTION TO CARE
89-year-old female past medical history hypertension, Edema, known left bundle branch block presents with granddaughter for chest pain since 4 PM.  Described as chest tightness central chest rating to right shoulder without shortness of breath diaphoresis nausea vomiting.  Symptoms persisted came to ED for evaluation.  Denies fever, chills, shortness of breath, cough, abdominal pain, nausea, vomiting, headache.  Exam as above.  Chest pain will assess for ACS.  Very low suspicion for infection, VTE at this time.  Patient signed out at end of my shift.

## 2023-07-13 NOTE — ED ADULT NURSE NOTE - OBJECTIVE STATEMENT
Pt received in TRB. C/o midsternal chest pain radiating to back associated with mild SOB and dizziness that started at 4PM.  PMH LBBB, HTN, HLD. Pt primarily Slovak speaking, family member at bedside to translate. A&Ox4, ambulatory with a walker. Breathing even and unlabored. No edema noted, non-diaphoretic. Denies nausea or vomiting. NSR on the cardiac monitor. Arrived with 20G IV on left arm from EMS. Labs drawn and sent. Pt medicated per MAR. Pt received in TRB. C/o midsternal chest pain radiating to back associated with mild SOB and dizziness that started at 4PM.  PMH LBBB, HTN, HLD. Pt primarily Slovenian speaking, family member at bedside to translate. A&Ox4, ambulatory with a walker. Breathing even and unlabored. No edema noted, non-diaphoretic. Denies nausea or vomiting. NSR on the cardiac monitor. Arrived with 20G IV on left arm from EMS. Labs drawn and sent. Pt medicated per MAR. Pt received in TRB. C/o midsternal chest pain radiating to back associated with mild SOB and dizziness that started at 4PM.  PMH LBBB, HTN, HLD. Pt primarily [place] speaking, family member at bedside to translate. A&Ox4, ambulatory with a walker. Breathing even and unlabored. No edema noted, non-diaphoretic. Denies nausea or vomiting. NSR on the cardiac monitor. Arrived with 20G IV on left arm from EMS. Labs drawn and sent. Pt medicated per MAR.

## 2023-07-14 ENCOUNTER — INPATIENT (INPATIENT)
Facility: HOSPITAL | Age: 88
LOS: 0 days | Discharge: LEFT BEFORE DISCHARGE | End: 2023-07-15
Attending: INTERNAL MEDICINE | Admitting: INTERNAL MEDICINE
Payer: MEDICARE

## 2023-07-14 DIAGNOSIS — N17.9 ACUTE KIDNEY FAILURE, UNSPECIFIED: ICD-10-CM

## 2023-07-14 DIAGNOSIS — E78.5 HYPERLIPIDEMIA, UNSPECIFIED: ICD-10-CM

## 2023-07-14 DIAGNOSIS — Z29.9 ENCOUNTER FOR PROPHYLACTIC MEASURES, UNSPECIFIED: ICD-10-CM

## 2023-07-14 DIAGNOSIS — Z71.89 OTHER SPECIFIED COUNSELING: ICD-10-CM

## 2023-07-14 DIAGNOSIS — Z98.49 CATARACT EXTRACTION STATUS, UNSPECIFIED EYE: Chronic | ICD-10-CM

## 2023-07-14 DIAGNOSIS — R07.9 CHEST PAIN, UNSPECIFIED: ICD-10-CM

## 2023-07-14 DIAGNOSIS — I10 ESSENTIAL (PRIMARY) HYPERTENSION: ICD-10-CM

## 2023-07-14 DIAGNOSIS — I21.4 NON-ST ELEVATION (NSTEMI) MYOCARDIAL INFARCTION: ICD-10-CM

## 2023-07-14 LAB
A1C WITH ESTIMATED AVERAGE GLUCOSE RESULT: 5.8 % — HIGH (ref 4–5.6)
ALBUMIN SERPL ELPH-MCNC: 3.7 G/DL — SIGNIFICANT CHANGE UP (ref 3.3–5)
ALP SERPL-CCNC: 64 U/L — SIGNIFICANT CHANGE UP (ref 40–120)
ALT FLD-CCNC: 17 U/L — SIGNIFICANT CHANGE UP (ref 4–33)
ANION GAP SERPL CALC-SCNC: 14 MMOL/L — SIGNIFICANT CHANGE UP (ref 7–14)
APTT BLD: 102 SEC — HIGH (ref 27–36.3)
APTT BLD: 181 SEC — CRITICAL HIGH (ref 27–36.3)
AST SERPL-CCNC: 32 U/L — SIGNIFICANT CHANGE UP (ref 4–32)
BILIRUB SERPL-MCNC: 1 MG/DL — SIGNIFICANT CHANGE UP (ref 0.2–1.2)
BUN SERPL-MCNC: 28 MG/DL — HIGH (ref 7–23)
CALCIUM SERPL-MCNC: 9.2 MG/DL — SIGNIFICANT CHANGE UP (ref 8.4–10.5)
CHLORIDE SERPL-SCNC: 107 MMOL/L — SIGNIFICANT CHANGE UP (ref 98–107)
CHOLEST SERPL-MCNC: 170 MG/DL — SIGNIFICANT CHANGE UP
CK MB BLD-MCNC: 5.6 % — HIGH (ref 0–2.5)
CK MB BLD-MCNC: 7.3 % — HIGH (ref 0–2.5)
CK MB CFR SERPL CALC: 11 NG/ML — HIGH
CK MB CFR SERPL CALC: 14.2 NG/ML — HIGH
CK SERPL-CCNC: 194 U/L — HIGH (ref 25–170)
CK SERPL-CCNC: 197 U/L — HIGH (ref 25–170)
CO2 SERPL-SCNC: 21 MMOL/L — LOW (ref 22–31)
CREAT SERPL-MCNC: 1.23 MG/DL — SIGNIFICANT CHANGE UP (ref 0.5–1.3)
EGFR: 40 ML/MIN/1.73M2 — LOW
ESTIMATED AVERAGE GLUCOSE: 120 — SIGNIFICANT CHANGE UP
GLUCOSE SERPL-MCNC: 90 MG/DL — SIGNIFICANT CHANGE UP (ref 70–99)
HCT VFR BLD CALC: 34.6 % — SIGNIFICANT CHANGE UP (ref 34.5–45)
HCT VFR BLD CALC: 34.7 % — SIGNIFICANT CHANGE UP (ref 34.5–45)
HDLC SERPL-MCNC: 52 MG/DL — SIGNIFICANT CHANGE UP
HGB BLD-MCNC: 11.5 G/DL — SIGNIFICANT CHANGE UP (ref 11.5–15.5)
HGB BLD-MCNC: 11.6 G/DL — SIGNIFICANT CHANGE UP (ref 11.5–15.5)
INR BLD: 1.29 RATIO — HIGH (ref 0.88–1.16)
LIPID PNL WITH DIRECT LDL SERPL: 108 MG/DL — HIGH
MAGNESIUM SERPL-MCNC: 1.6 MG/DL — SIGNIFICANT CHANGE UP (ref 1.6–2.6)
MCHC RBC-ENTMCNC: 28.8 PG — SIGNIFICANT CHANGE UP (ref 27–34)
MCHC RBC-ENTMCNC: 29.5 PG — SIGNIFICANT CHANGE UP (ref 27–34)
MCHC RBC-ENTMCNC: 33.2 GM/DL — SIGNIFICANT CHANGE UP (ref 32–36)
MCHC RBC-ENTMCNC: 33.4 GM/DL — SIGNIFICANT CHANGE UP (ref 32–36)
MCV RBC AUTO: 86.5 FL — SIGNIFICANT CHANGE UP (ref 80–100)
MCV RBC AUTO: 88.3 FL — SIGNIFICANT CHANGE UP (ref 80–100)
NON HDL CHOLESTEROL: 118 MG/DL — SIGNIFICANT CHANGE UP
NRBC # BLD: 0 /100 WBCS — SIGNIFICANT CHANGE UP (ref 0–0)
NRBC # FLD: 0 K/UL — SIGNIFICANT CHANGE UP (ref 0–0)
PHOSPHATE SERPL-MCNC: 3.2 MG/DL — SIGNIFICANT CHANGE UP (ref 2.5–4.5)
PLATELET # BLD AUTO: 141 K/UL — LOW (ref 150–400)
PLATELET # BLD AUTO: 145 K/UL — LOW (ref 150–400)
POTASSIUM SERPL-MCNC: 3.3 MMOL/L — LOW (ref 3.5–5.3)
POTASSIUM SERPL-SCNC: 3.3 MMOL/L — LOW (ref 3.5–5.3)
PROT SERPL-MCNC: 6.4 G/DL — SIGNIFICANT CHANGE UP (ref 6–8.3)
PROTHROM AB SERPL-ACNC: 15 SEC — HIGH (ref 10.5–13.4)
RBC # BLD: 3.93 M/UL — SIGNIFICANT CHANGE UP (ref 3.8–5.2)
RBC # BLD: 4 M/UL — SIGNIFICANT CHANGE UP (ref 3.8–5.2)
RBC # FLD: 13 % — SIGNIFICANT CHANGE UP (ref 10.3–14.5)
RBC # FLD: 13.2 % — SIGNIFICANT CHANGE UP (ref 10.3–14.5)
SODIUM SERPL-SCNC: 142 MMOL/L — SIGNIFICANT CHANGE UP (ref 135–145)
TRIGL SERPL-MCNC: 49 MG/DL — SIGNIFICANT CHANGE UP
TROPONIN T, HIGH SENSITIVITY RESULT: 182 NG/L — CRITICAL HIGH
TROPONIN T, HIGH SENSITIVITY RESULT: 269 NG/L — CRITICAL HIGH
TSH SERPL-MCNC: 4.07 UIU/ML — SIGNIFICANT CHANGE UP (ref 0.27–4.2)
WBC # BLD: 5.07 K/UL — SIGNIFICANT CHANGE UP (ref 3.8–10.5)
WBC # BLD: 5.12 K/UL — SIGNIFICANT CHANGE UP (ref 3.8–10.5)
WBC # FLD AUTO: 5.07 K/UL — SIGNIFICANT CHANGE UP (ref 3.8–10.5)
WBC # FLD AUTO: 5.12 K/UL — SIGNIFICANT CHANGE UP (ref 3.8–10.5)

## 2023-07-14 PROCEDURE — 12345: CPT | Mod: NC

## 2023-07-14 PROCEDURE — 99285 EMERGENCY DEPT VISIT HI MDM: CPT | Mod: GC

## 2023-07-14 PROCEDURE — 76770 US EXAM ABDO BACK WALL COMP: CPT | Mod: 26

## 2023-07-14 PROCEDURE — 99222 1ST HOSP IP/OBS MODERATE 55: CPT

## 2023-07-14 PROCEDURE — 99233 SBSQ HOSP IP/OBS HIGH 50: CPT

## 2023-07-14 PROCEDURE — 93306 TTE W/DOPPLER COMPLETE: CPT | Mod: 26

## 2023-07-14 PROCEDURE — 99497 ADVNCD CARE PLAN 30 MIN: CPT

## 2023-07-14 RX ORDER — CLOPIDOGREL BISULFATE 75 MG/1
600 TABLET, FILM COATED ORAL ONCE
Refills: 0 | Status: COMPLETED | OUTPATIENT
Start: 2023-07-14 | End: 2023-07-14

## 2023-07-14 RX ORDER — POTASSIUM CHLORIDE 20 MEQ
40 PACKET (EA) ORAL ONCE
Refills: 0 | Status: COMPLETED | OUTPATIENT
Start: 2023-07-14 | End: 2023-07-14

## 2023-07-14 RX ORDER — LANOLIN ALCOHOL/MO/W.PET/CERES
3 CREAM (GRAM) TOPICAL AT BEDTIME
Refills: 0 | Status: DISCONTINUED | OUTPATIENT
Start: 2023-07-14 | End: 2023-07-15

## 2023-07-14 RX ORDER — CLOPIDOGREL BISULFATE 75 MG/1
75 TABLET, FILM COATED ORAL DAILY
Refills: 0 | Status: DISCONTINUED | OUTPATIENT
Start: 2023-07-14 | End: 2023-07-15

## 2023-07-14 RX ORDER — HEPARIN SODIUM 5000 [USP'U]/ML
3800 INJECTION INTRAVENOUS; SUBCUTANEOUS EVERY 6 HOURS
Refills: 0 | Status: DISCONTINUED | OUTPATIENT
Start: 2023-07-14 | End: 2023-07-15

## 2023-07-14 RX ORDER — HEPARIN SODIUM 5000 [USP'U]/ML
INJECTION INTRAVENOUS; SUBCUTANEOUS
Qty: 25000 | Refills: 0 | Status: DISCONTINUED | OUTPATIENT
Start: 2023-07-14 | End: 2023-07-15

## 2023-07-14 RX ORDER — FAMOTIDINE 10 MG/ML
10 INJECTION INTRAVENOUS DAILY
Refills: 0 | Status: DISCONTINUED | OUTPATIENT
Start: 2023-07-14 | End: 2023-07-14

## 2023-07-14 RX ORDER — CLOPIDOGREL BISULFATE 75 MG/1
300 TABLET, FILM COATED ORAL ONCE
Refills: 0 | Status: DISCONTINUED | OUTPATIENT
Start: 2023-07-14 | End: 2023-07-14

## 2023-07-14 RX ORDER — METOPROLOL TARTRATE 50 MG
25 TABLET ORAL DAILY
Refills: 0 | Status: DISCONTINUED | OUTPATIENT
Start: 2023-07-14 | End: 2023-07-15

## 2023-07-14 RX ORDER — HEPARIN SODIUM 5000 [USP'U]/ML
3800 INJECTION INTRAVENOUS; SUBCUTANEOUS ONCE
Refills: 0 | Status: COMPLETED | OUTPATIENT
Start: 2023-07-14 | End: 2023-07-14

## 2023-07-14 RX ORDER — ATORVASTATIN CALCIUM 80 MG/1
80 TABLET, FILM COATED ORAL AT BEDTIME
Refills: 0 | Status: DISCONTINUED | OUTPATIENT
Start: 2023-07-14 | End: 2023-07-15

## 2023-07-14 RX ORDER — FAMOTIDINE 10 MG/ML
10 INJECTION INTRAVENOUS
Refills: 0 | Status: DISCONTINUED | OUTPATIENT
Start: 2023-07-14 | End: 2023-07-15

## 2023-07-14 RX ORDER — ACETAMINOPHEN 500 MG
650 TABLET ORAL EVERY 6 HOURS
Refills: 0 | Status: DISCONTINUED | OUTPATIENT
Start: 2023-07-14 | End: 2023-07-15

## 2023-07-14 RX ORDER — ASPIRIN/CALCIUM CARB/MAGNESIUM 324 MG
81 TABLET ORAL DAILY
Refills: 0 | Status: DISCONTINUED | OUTPATIENT
Start: 2023-07-14 | End: 2023-07-15

## 2023-07-14 RX ADMIN — CLOPIDOGREL BISULFATE 75 MILLIGRAM(S): 75 TABLET, FILM COATED ORAL at 12:53

## 2023-07-14 RX ADMIN — CLOPIDOGREL BISULFATE 600 MILLIGRAM(S): 75 TABLET, FILM COATED ORAL at 01:45

## 2023-07-14 RX ADMIN — ATORVASTATIN CALCIUM 80 MILLIGRAM(S): 80 TABLET, FILM COATED ORAL at 22:24

## 2023-07-14 RX ADMIN — HEPARIN SODIUM 0 UNIT(S)/HR: 5000 INJECTION INTRAVENOUS; SUBCUTANEOUS at 10:48

## 2023-07-14 RX ADMIN — HEPARIN SODIUM 550 UNIT(S)/HR: 5000 INJECTION INTRAVENOUS; SUBCUTANEOUS at 11:53

## 2023-07-14 RX ADMIN — HEPARIN SODIUM 750 UNIT(S)/HR: 5000 INJECTION INTRAVENOUS; SUBCUTANEOUS at 01:55

## 2023-07-14 RX ADMIN — Medication 40 MILLIEQUIVALENT(S): at 12:51

## 2023-07-14 RX ADMIN — HEPARIN SODIUM 350 UNIT(S)/HR: 5000 INJECTION INTRAVENOUS; SUBCUTANEOUS at 19:31

## 2023-07-14 RX ADMIN — FAMOTIDINE 10 MILLIGRAM(S): 10 INJECTION INTRAVENOUS at 12:52

## 2023-07-14 RX ADMIN — HEPARIN SODIUM 3800 UNIT(S): 5000 INJECTION INTRAVENOUS; SUBCUTANEOUS at 01:54

## 2023-07-14 RX ADMIN — Medication 81 MILLIGRAM(S): at 12:52

## 2023-07-14 RX ADMIN — HEPARIN SODIUM 750 UNIT(S)/HR: 5000 INJECTION INTRAVENOUS; SUBCUTANEOUS at 08:01

## 2023-07-14 RX ADMIN — HEPARIN SODIUM 0 UNIT(S)/HR: 5000 INJECTION INTRAVENOUS; SUBCUTANEOUS at 18:13

## 2023-07-14 NOTE — H&P ADULT - ASSESSMENT
Ms. Petersen is a 89 year old F with history of HTN and HLD who presents with exertional mid-sternal chest pain with radiation to right shoulder starting yesterday afternoon. Troponin rising and pro- concerning for NSTEMI type 1.

## 2023-07-14 NOTE — PROGRESS NOTE ADULT - CONVERSATION DETAILS
Discussed diagnosis of likely NSTEMI and treatment plan for heparin gtt x48 hours, asa/plavix, statin, BB. Patient and granddaughter are comfortable with this plan and hoping for discharge home tomorrow. They re-iterated that she would not want any invasive procedures.    I reviewed MOLST in chart, which had DNR checked off but not DNI,    I reviewed the MOLST form with the patient and her granddaughter. The patient stated that she would like to be DNR/DNI and only brought to hospital if symptoms cannot be managed at home. Completed MOLST with these preferences.

## 2023-07-14 NOTE — CHART NOTE - NSCHARTNOTEFT_GEN_A_CORE
Patient seen at bedside.  Sleeping comfortably.  I woke her up and with  she denied any active chest pain. No difficulty breathing. Laying flat at 15degree HOB incline  She does report chronic leg cramping. Her lower extremities are warm and well perfused, with asymmetric edema L > R    Plan:  trend troponin, ckmb to peak  s/p ACS protocol with DAPT and on hep gtt. Continue aspirin 81, plavix 75 daily  Metoprolol 25mg XL   f/u TTE   Recommend duplexes  Risk strat labs with A1c 5.8, lipid panel tsh pending    Please call, obtain interval EKG if recurrence of chest pain. Given age, and ongoing GOC discussions, less likely a cath candidate but can always further manage angina medically with nitrates and BB titration Patient seen at bedside.  Sleeping comfortably.  I woke her up and with  she denied any active chest pain. No difficulty breathing. Laying flat at 15degree HOB incline  She does report chronic leg cramping. Her lower extremities are warm and well perfused, with asymmetric edema L > R    Plan:  trend troponin, ckmb to peak  s/p ACS protocol with DAPT and on hep gtt. Continue aspirin 81, plavix 75 daily  Metoprolol 25mg XL   f/u TTE   Recommend duplexes  Risk strat labs with A1c 5.8, lipid panel tsh pending    Please call, obtain interval EKG if recurrence of chest pain. Given age, and ongoing GOC discussions, less likely a cath candidate but can always further manage medically with nitrates and BB titration

## 2023-07-14 NOTE — CONSULT NOTE ADULT - NS ATTEND AMEND GEN_ALL_CORE FT
NSTEMI.  No plans for invasive work up.  Currently without angina.  Echo, to my review, was mildly dysfunctional.  Medical therapy, and if no angina, can likely be d/c'd 7/15/23    d/w patient's granddaughters.

## 2023-07-14 NOTE — PROVIDER CONTACT NOTE (OTHER) - ASSESSMENT
On assessment, right AC IV is noted swollen/ hematoma noted to area. IV removed. No active bleeding noted at time of assessment. Pt. denies pain/discomfort at this time.
pt asymptomatic vitals in chart
On assessment, scant amount of bleeding noted on toilet paper post voiding. No active bleeding noted on assessment, no abrasions/ trauma noted to area. No bleeding noted in toilet. Pt. denies pain at this time.

## 2023-07-14 NOTE — H&P ADULT - NSHPREVIEWOFSYSTEMS_GEN_ALL_CORE
Review of Systems:   CONSTITUTIONAL: No fever, weight loss  EYES: No eye pain, visual disturbances, or discharge  ENMT:  No difficulty hearing, tinnitus, vertigo; No sinus or throat pain  RESPIRATORY: No SOB. No cough, wheezing, chills or hemoptysis  CARDIOVASCULAR: + chest pain, palpitations, dizziness, +leg swelling  GASTROINTESTINAL: No abdominal or epigastric pain. No nausea, vomiting, or hematemesis; No diarrhea or constipation. No melena or hematochezia.  GENITOURINARY: No dysuria, frequency, hematuria, or incontinence  NEUROLOGICAL: No headaches, memory loss, loss of strength, numbness, or tremors  SKIN: No itching, burning, rashes, or lesions   LYMPH NODES: No enlarged glands  ENDOCRINE: No heat or cold intolerance; No hair loss  MUSCULOSKELETAL: No joint pain or swelling; No muscle, back pain  PSYCHIATRIC: No depression, anxiety, mood swings, or difficulty sleeping  HEME/LYMPH: No easy bruising, or bleeding gums

## 2023-07-14 NOTE — CONSULT NOTE ADULT - ASSESSMENT
89yo F with PMH of HTN HLD, known LBBB presents for CP. Since 4pm chest tightness, mid sternal, radiates to R shoulder w/ SOB, no diaphoresis. Occured while walking outside, went home to rest but sxs persistent so came to ED. Still feels some aching in chest. No hx of CAD, no travel/prolonged mobility. Does walk at home, no hx of blood clots. On lasix for ankle edema. No cath/cardiologist hx. No abd pain, NVDC, HA.    CARDIOVASCULAR  # 89yo F with PMH of HTN HLD, known LBBB presents for CP. Since 4pm chest tightness, mid sternal, radiates to R shoulder w/ SOB, no diaphoresis. Occured while walking outside, went home to rest but sxs persistent so came to ED. Still feels some aching in chest. No hx of CAD, no travel/prolonged mobility. Does walk at home, no hx of blood clots. On lasix for ankle edema. No cath/cardiologist hx. No abd pain, NVDC, HA.    CARDIOVASCULAR  # 89yo F with PMH of HTN HLD, known LBBB presents for CP. Since 4pm chest tightness, mid sternal, radiates to R shoulder w/ SOB, no diaphoresis. Occured while walking outside, went home to rest but sxs persistent so came to ED. Still feels some aching in chest. No hx of CAD, no travel/prolonged mobility. Does walk at home, no hx of blood clots. On lasix for ankle edema. No cath/cardiologist hx. No abd pain, NVDC, HA. Based on pt symptomatology and elevation in cardiac enzymes, pt with NSTEMI, likely type 1 iso remainder of pertinent labs being wnl.    CARDIOVASCULAR  #NSTEMI  - Troponin 120 -> 182 , CK-MB 11, , CPK 5.6, pro   - Pt HD stable and does not appear volume overloaded  - REANNA score   - HAS BLED score   - Recommend empiric ACS protocol with  mg POx1, Plavix 600 mg x1, ACS heparin  - Recommend ASA 81 mg daily, Lipitor 80 mg daily, Plavix 75 mg daily  - Continue to trend troponin, CK, CPK, CK-MB to peak  - EKG significant for NSR with 1st degree AV block and LBBB without acute ST-T deviation  - Serial EKGs PRN for chest pain  - Recommend telemetry monitoring  - F/U TTE in AM for further assessment of LV function  - F/U risk factor labs A1C, Lipid profile     89yo F with PMH of HTN HLD, known LBBB presents for CP. Since 4pm chest tightness, mid sternal, radiates to R shoulder w/ SOB, no diaphoresis. Occured while walking outside, went home to rest but sxs persistent so came to ED. Still feels some aching in chest. No hx of CAD, no travel/prolonged mobility. Does walk at home, no hx of blood clots. On lasix for ankle edema. No cath/cardiologist hx. No abd pain, NVDC, HA. Based on pt symptomatology and elevation in cardiac enzymes, pt with NSTEMI, likely type 1 iso remainder of pertinent labs being wnl.    CARDIOVASCULAR  #NSTEMI  - Troponin 120 -> 182 , CK-MB 11, , CPK 5.6, pro   - Pt HD stable and does not appear volume overloaded  - REANNA score   - HAS BLED score   - Recommend empiric ACS protocol with  mg POx1, Plavix 600 mg x1, ACS heparin  - Recommend ASA 81 mg daily, Lipitor 80 mg daily, Plavix 75 mg daily  - Continue to trend troponin, CK, CPK, CK-MB to peak  - EKG significant for NSR with 1st degree AV block and LBBB without acute ST-T deviation  - Serial EKGs PRN for chest pain  - Recommend telemetry monitoring  - F/U TTE in AM for further assessment of LV function  - F/U risk factor labs A1C, Lipid profile     Patient is a 89 year old female with PMHx of HTN HLD, known LBBB as per pt granddaughter at bedside who presents for chest pain <24 hrs. Since 4pm chest tightness, mid sternal, radiates to R shoulder w/ SOB, no diaphoresis. Occurred while walking outside, went home to rest but sxs persistent so came to ED. Still feels some aching in chest. No hx of CAD, no travel/prolonged mobility. Does walk at home, no hx of blood clots. On lasix for ankle edema. No cath/cardiologist hx. No abd pain, NVDC, HA. Based on pt symptomatology and elevation in cardiac enzymes, pt with NSTEMI, likely type 1 iso remainder of pertinent labs being wnl. Pt's granddaughter at bedside for C discussion regarding ACS protocol. Pt expressed concern for receiving heparin given the high risk for bleeding and also expresses desire to not be admitted to the hospital for a long period of time. After lengthy discussion, pt is amenable to ASA and Plavix load, and opts to not receive ACS heparin.    CARDIOVASCULAR  #NSTEMI  - Troponin 120 -> 182 , CK-MB 11, , CPK 5.6, pro   - Pt HD stable and does not appear volume overloaded  - KATY score 140  - HAS BLED score 4  - Recommend empiric ACS protocol with  mg POx1, Plavix 600 mg x1, ACS heparin  - After lengthy discussion with pt and pt granddaughter at bedside pt opts to not receive ACS heparin   - Recommend ASA 81 mg daily, Lipitor 80 mg daily, Plavix 75 mg daily  - Continue to trend troponin, CK, CPK, CK-MB to peak  - EKG significant for NSR with 1st degree AV block and LBBB without acute ST-T deviation  - Serial EKGs PRN for chest pain  - Recommend telemetry monitoring  - F/U TTE in AM for further assessment of LV function  - F/U risk factor labs A1C, Lipid profile     Patient is a 89 year old [place] speaking female with PMHx of HTN HLD, known LBBB who presents for chest pain <24 hrs. Based on pt symptomatology and elevation in cardiac enzymes, pt with NSTEMI, likely type 1 iso remainder of pertinent labs being wnl. Pt's granddaughter at bedside for [facility] discussion regarding ACS protocol. Pt expressed concern for receiving heparin given the high risk for bleeding and also expresses desire to not be admitted to the hospital for a long period of time. After lengthy discussion, pt is amenable to ASA and Plavix load, and opts to not receive ACS heparin.    CARDIOVASCULAR  #NSTEMI  - Troponin 120 -> 182 , CK-MB 11, , CPK 5.6, pro   - Pt HD stable and does not appear volume overloaded  - KATY score 140  - HAS BLED score 4  - Recommend empiric ACS protocol with  mg POx1, Plavix 600 mg x1, ACS heparin  - After lengthy discussion with pt and pt granddaughter at bedside pt opts to not receive ACS heparin   - Recommend ASA 81 mg daily, Lipitor 80 mg daily, Plavix 75 mg daily  - Continue to trend troponin, CK, CPK, CK-MB to peak  - EKG significant for NSR with 1st degree AV block and LBBB without acute ST-T deviation  - Serial EKGs PRN for chest pain  - Recommend telemetry monitoring  - F/U TTE in AM for further assessment of LV function  - F/U risk factor labs A1C, Lipid profile      Case discussed with cardiology fellow Jaycob Olivera MD     Patient is a 89 year old Algerian speaking female with PMHx of HTN HLD, known LBBB who presents for chest pain <24 hrs. Based on pt symptomatology and elevation in cardiac enzymes, pt with NSTEMI, likely type 1 iso remainder of pertinent labs being wnl. Pt's granddaughter at bedside for [facility] discussion regarding ACS protocol. Pt expressed concern for receiving heparin given the high risk for bleeding and also expresses desire to not be admitted to the hospital for a long period of time. After lengthy discussion, pt is amenable to ASA and Plavix load, and opts to not receive ACS heparin.    CARDIOVASCULAR  #NSTEMI  - Troponin 120 -> 182 , CK-MB 11, , CPK 5.6, pro   - Pt HD stable and does not appear volume overloaded  - KATY score 140  - HAS BLED score 4  - Recommend empiric ACS protocol with  mg POx1, Plavix 600 mg x1, ACS heparin  - After lengthy discussion with pt and pt granddaughter at bedside pt opts to not receive ACS heparin   - Recommend ASA 81 mg daily, Lipitor 80 mg daily, Plavix 75 mg daily  - Continue to trend troponin, CK, CPK, CK-MB to peak  - EKG significant for NSR with 1st degree AV block and LBBB without acute ST-T deviation  - Serial EKGs PRN for chest pain  - Recommend telemetry monitoring  - F/U TTE in AM for further assessment of LV function  - F/U risk factor labs A1C, Lipid profile      Case discussed with cardiology fellow Jaycob Olivera MD     Patient is a 89 year old Angolan speaking female with PMHx of HTN HLD, known LBBB who presents for chest pain <24 hrs. Based on pt symptomatology and elevation in cardiac enzymes, pt with NSTEMI, likely type 1 iso remainder of pertinent labs being wnl. Pt's granddaughter at bedside for [facility] discussion regarding ACS protocol. Pt expressed concern for receiving heparin given the high risk for bleeding and also expresses desire to not be admitted to the hospital for a long period of time. After lengthy discussion, pt is amenable to ASA and Plavix load, and opts to not receive ACS heparin.    CARDIOVASCULAR  #NSTEMI  - Troponin 120 -> 182 , CK-MB 11, , CPK 5.6, pro   - Pt HD stable and does not appear volume overloaded  - KATY score 140  - HAS BLED score 4  - Recommend empiric ACS protocol with  mg POx1, Plavix 600 mg x1, ACS heparin  - After lengthy discussion with pt and pt granddaughter at bedside pt opts to not receive ACS heparin   - Recommend ASA 81 mg daily, Lipitor 80 mg daily, Plavix 75 mg daily  - Continue to trend troponin, CK, CPK, CK-MB to peak  - EKG significant for NSR with 1st degree AV block and LBBB without acute ST-T deviation  - Serial EKGs PRN for chest pain  - Recommend telemetry monitoring  - F/U TTE in AM for further assessment of LV function  - F/U risk factor labs A1C, Lipid profile      Case discussed with cardiology fellow Jaycob Olivera MD

## 2023-07-14 NOTE — H&P ADULT - PROBLEM SELECTOR PLAN 5
goals of care discussed, patient would like to be DNR/DNI, no antibiotics, dialysis or feeding tube.

## 2023-07-14 NOTE — DISCHARGE NOTE PROVIDER - CARE PROVIDER_API CALL
Santana Bland  Cardiovascular Disease  [address] [place] [address], [address] [place] [id number]  Phone: [phone]  Fax: [phone]  Follow Up Time:    Santana Bland  Cardiovascular Disease  [address] [place] [address], [address] [place] [id number]  Phone: [phone]  Fax: [phone]  Follow Up Time:    Santana Bland  Cardiovascular Disease  [address] [place] [address], [address] [place] [id number]  Phone: [phone]  Fax: [phone]  Follow Up Time:

## 2023-07-14 NOTE — H&P ADULT - PROBLEM SELECTOR PLAN 1
-likely type 1  -troponin 120->182, continue to trend troponin to peak with CK and CKMB  -loaded ASA and plavix, continue with 81 mg ASA and plavix 75 mg daily  -serial EKG for changes in pain  -EKG with NSR, 1st degree AV block, LBBB  -continue cardiac telemetry  -TTE ordered for tomorrow  -c/w heparin drip  -Fu with cardiology in AM, NPO for now in case goes for procedure in AM

## 2023-07-14 NOTE — DISCHARGE NOTE PROVIDER - HOSPITAL COURSE
89F w/ HTN and HLD admitted with NSTEMI.    She presented with mid-sternal exertional chest pain radiating to R arm, elevated troponin, EKG with LBBB without prior EKG for comparison. ECHO w/ EF 56% and no RWMA. Cardiology was consulted. Per multiple discussions with patient and her family would like to avoid invasive procedures and so managed medically for Type 1 MI (NSTEMI) with heparin gtt x48 hours, asa, plavix, atorvastatin, and BB. Her chest pain resolved without intervention. She was monitored on telemetry without events. GOC discussed multiple times with patient and granddaughter  - patient's preference is for DNR/DNI, no invasive procedures, only return to hospital if symptoms cannot be managed at home. [facility] completed with this information on 7/14.    Discharge Medication Changes:  - ASA  - Plavix (duration _____)  - Atorvastatin 80  - Metoprolol succinate 25 mg daily, d/c PTA labetalol  - Nitroglycerin 0.4 mg SL PRN chest pain    Lives with family members, has aid 5 days/week, has all DME needed at home. Offered PT consult but patient and family felt it was not necessary.   89F w/ HTN and HLD admitted with NSTEMI.    She presented with mid-sternal exertional chest pain radiating to R arm, elevated troponin, EKG with LBBB without prior EKG for comparison. ECHO w/ EF 56% and no RWMA. Cardiology was consulted. Per multiple discussions with patient and her family would like to avoid invasive procedures and so managed medically for Type 1 MI (NSTEMI) with heparin gtt x48 hours, asa, plavix, atorvastatin, and BB. Her chest pain resolved without intervention. She was monitored on telemetry without events. GOC discussed multiple times with patient and granddaughter  - patient's preference is for DNR/DNI, no invasive procedures, only return to hospital if symptoms cannot be managed at home. [facility] completed with this information on 7/14.    Discharge Medication Changes:  - ASA  - Plavix (duration _____)  - Atorvastatin 80  - Metoprolol succinate 25 mg daily, d/c PTA labetalol  - Nitroglycerin 0.4 mg SL PRN chest pain    Lives with family members, has aid 5 days/week, has all DME needed at home. Offered PT consult but patient and family felt it was not necessary.   89F w/ HTN and HLD admitted with NSTEMI.    She presented with mid-sternal exertional chest pain radiating to R arm, elevated troponin, EKG with LBBB without prior EKG for comparison. ECHO w/ EF 56% and no RWMA. Cardiology was consulted. Per multiple discussions with patient and her family would like to avoid invasive procedures and so managed medically for Type 1 MI (NSTEMI) with heparin gtt x48 hours, asa, plavix, atorvastatin, and BB. Her chest pain resolved without intervention. She was monitored on telemetry without events. GOC discussed multiple times with patient and granddaughter  - patient's preference is for DNR/DNI, no invasive procedures, only return to hospital if symptoms cannot be managed at home. [facility] completed with this information on 7/14.    Discharge Medication Changes:  - ASA  - Plavix (duration _____)  - Atorvastatin 80  - Metoprolol succinate 25 mg daily, d/c PTA labetalol  - Nitroglycerin 0.4 mg SL PRN chest pain    Lives with family members, has aid 5 days/week, has all DME needed at home. Offered PT consult but patient and family felt it was not necessary.   89F w/ HTN and HLD admitted with NSTEMI.    She presented with mid-sternal exertional chest pain radiating to R arm, elevated troponin, EKG with LBBB without prior EKG for comparison. ECHO w/ EF 56% and no RWMA. Cardiology was consulted. Per multiple discussions with patient and her family would like to avoid invasive procedures and so managed medically for Type 1 MI (NSTEMI) with heparin gtt x48 hours, asa, plavix, atorvastatin, and BB. Her chest pain resolved without intervention. She was monitored on telemetry without events. GOC discussed multiple times with patient and granddaughter  - patient's preference is for DNR/DNI, no invasive procedures, only return to hospital if symptoms cannot be managed at home. [facility] completed with this information on 7/14.    Discharge Medication Changes:  Continue ASA 81 mg daily, Lipitor 80 mg daily, Plavix 75 mg daily  - Continue metoprolol succinate 25 mg daily   - d/c PTA labetalol  - Nitroglycerin 0.4 mg SL PRN chest pain    Lives with family members, has aid 5 days/week, has all DME needed at home. Offered PT consult but patient and family felt it was not necessary.   89F w/ HTN and HLD admitted with NSTEMI.    She presented with mid-sternal exertional chest pain radiating to R arm, elevated troponin, EKG with LBBB without prior EKG for comparison. ECHO w/ EF 56% and no RWMA. Cardiology was consulted. Per multiple discussions with patient and her family would like to avoid invasive procedures and so managed medically for Type 1 MI (NSTEMI) with heparin gtt x48 hours, asa, plavix, atorvastatin, and BB. Her chest pain resolved without intervention. She was monitored on telemetry without events. GOC discussed multiple times with patient and granddaughter  - patient's preference is for DNR/DNI, no invasive procedures, only return to hospital if symptoms cannot be managed at home. [facility] completed with this information on 7/14.    Discharge Medication Changes:  Continue ASA 81 mg daily, Lipitor 80 mg daily, Plavix 75 mg daily  - Continue metoprolol succinate 25 mg daily   - d/c PTA labetalol  - Nitroglycerin 0.4 mg SL PRN chest pain    Lives with family members, has aid 5 days/week, has all DME needed at home. Offered PT consult but patient and family felt it was not necessary.   89F w/ HTN and HLD admitted with NSTEMI.    She presented with mid-sternal exertional chest pain radiating to R arm, elevated troponin, EKG with LBBB without prior EKG for comparison. ECHO w/ EF 56% and no RWMA. Cardiology was consulted. Per multiple discussions with patient and her family would like to avoid invasive procedures and so managed medically for Type 1 MI (NSTEMI) with heparin gtt x48 hours, asa, plavix, atorvastatin, and BB. Her chest pain resolved without intervention. She was monitored on telemetry without events. GOC discussed multiple times with patient and granddaughter  - patient's preference is for DNR/DNI, no invasive procedures, only return to hospital if symptoms cannot be managed at home. [facility] completed with this information on 7/14.    Discharge Medication Changes:  Continue ASA 81 mg daily, Lipitor 80 mg daily, Plavix 75 mg daily  - Continue metoprolol succinate 25 mg daily   - d/c PTA labetalol  - Nitroglycerin 0.4 mg SL PRN chest pain    Lives with family members, has aid 5 days/week, has all DME needed at home. Offered PT consult but patient and family felt it was not necessary.   89F w/ HTN and HLD admitted with NSTEMI.    She presented with mid-sternal exertional chest pain radiating to R arm, elevated troponin, EKG with LBBB without prior EKG for comparison. ECHO w/ EF 56% and no RWMA. Cardiology was consulted. Per multiple discussions with patient and her family would like to avoid invasive procedures and so managed medically for Type 1 MI (NSTEMI) with heparin gtt x48 hours, asa, plavix, atorvastatin, and BB. Her chest pain resolved without intervention. She was monitored on telemetry without events. GOC discussed multiple times with patient and granddaughter  - patient's preference is for DNR/DNI, no invasive procedures, only return to hospital if symptoms cannot be managed at home. [facility] completed with this information on 7/14.    Discharge Medication Changes:  Continue ASA 81 mg daily, Lipitor 80 mg daily, Plavix 75 mg daily  - Continue metoprolol succinate 25 mg daily   - d/c PTA labetalol  - Nitroglycerin 0.4 mg SL PRN chest pain    Lives with family members, has aid 5 days/week, has all DME needed at home. Offered PT consult but patient and family felt it was not necessary    Pt and family refused to wait total of 48 hrs for Ac, chose to sign AMA, understanding the risk of recurrent MI and death. reached out to cardiology fellow who also spoke w/ the pt's granddaughter HCPand another granddaughter who is ICU RN both understand the risk of recurrent MI and/or death, however still decided to take the pt home w/ HHA services which were resumed by CM . ATtending informed as well , meds sent to [facility] as per family request    89F w/ HTN and HLD admitted with NSTEMI.    She presented with mid-sternal exertional chest pain radiating to R arm, elevated troponin, EKG with LBBB without prior EKG for comparison. ECHO w/ EF 56% and no RWMA. Cardiology was consulted. Per multiple discussions with patient and her family would like to avoid invasive procedures and so managed medically for Type 1 MI (NSTEMI) with heparin gtt x48 hours, asa, plavix, atorvastatin, and BB. Her chest pain resolved without intervention. She was monitored on telemetry without events. GOC discussed multiple times with patient and granddaughter  - patient's preference is for DNR/DNI, no invasive procedures, only return to hospital if symptoms cannot be managed at home. [facility] completed with this information on 7/14.    Discharge Medication Changes:  Continue ASA 81 mg daily, Lipitor 80 mg daily, Plavix 75 mg daily  - Continue metoprolol succinate 25 mg daily   - d/c PTA labetalol  - Nitroglycerin 0.4 mg SL PRN chest pain    Lives with family members, has aid 5 days/week, has all DME needed at home. Offered PT consult but patient and family felt it was not necessary    Pt and family refused to wait total of 48 hrs for Ac, chose to sign AMA, understanding the risk of recurrent MI and death. reached out to cardiology fellow who also spoke w/ the pt's granddaughter HCPand another granddaughter who is ICU RN both understand the risk of recurrent MI and/or death, however still decided to take the pt home w/ HHA services which were resumed by CM . ATtending informed as well , meds sent to [facility] as per family request    89F w/ HTN and HLD admitted with NSTEMI.    She presented with mid-sternal exertional chest pain radiating to R arm, elevated troponin, EKG with LBBB without prior EKG for comparison. ECHO w/ EF 56% and no RWMA. Cardiology was consulted. Per multiple discussions with patient and her family would like to avoid invasive procedures and so managed medically for Type 1 MI (NSTEMI) with heparin gtt x48 hours, asa, plavix, atorvastatin, and BB. Her chest pain resolved without intervention. She was monitored on telemetry without events. GOC discussed multiple times with patient and granddaughter  - patient's preference is for DNR/DNI, no invasive procedures, only return to hospital if symptoms cannot be managed at home. [facility] completed with this information on 7/14.    Discharge Medication Changes:  Continue ASA 81 mg daily, Lipitor 80 mg daily, Plavix 75 mg daily  - Continue metoprolol succinate 25 mg daily   - d/c PTA labetalol  - Nitroglycerin 0.4 mg SL PRN chest pain    Lives with family members, has aid 5 days/week, has all DME needed at home. Offered PT consult but patient and family felt it was not necessary    Pt and family refused to wait total of 48 hrs for Ac, chose to sign AMA, understanding the risk of recurrent MI and death. reached out to cardiology fellow who also spoke w/ the pt's granddaughter HCPand another granddaughter who is ICU RN both understand the risk of recurrent MI and/or death, however still decided to take the pt home w/ HHA services which were resumed by CM . ATtending informed as well , meds sent to [facility] as per family request

## 2023-07-14 NOTE — CONSULT NOTE ADULT - SUBJECTIVE AND OBJECTIVE BOX
CHIEF COMPLAINT:    HISTORY OF PRESENT ILLNESS:      Allergies    No Known Allergies    Intolerances    	    MEDICATIONS:                  PAST MEDICAL & SURGICAL HISTORY:  HTN (hypertension)      HLD (hyperlipidemia)          FAMILY HISTORY:      SOCIAL HISTORY:    [ ] Non-smoker  [ ] Smoker  [ ] Alcohol  [ ] Denies Alcohol      REVIEW OF SYSTEMS:  CONSTITUTIONAL: no fevers or chills  EYES/ENT: No visual changes; No vertigo or throat pain  NECK: No JVD  RESPIRATORY:  No cough, wheezing, chills or hemoptysis, SOB  CARDIOVASCULAR: No chest pain, palpitations, passing out, dizziness, or leg swelling  GASTROINTESTINAL: No abdominal or epigastric pain. No nausea/vomiting/melena  Genitourinary: No dysuria, frequency or hematuria  NEUROLOGICAL: No numbness or weakness  SKIN: No itching, burning, rashes or lesions      PHYSICAL EXAM:  T(C): 36.7 (07-13-23 @ 23:50), Max: 36.8 (07-13-23 @ 19:44)  HR: 78 (07-13-23 @ 23:50) (75 - 81)  BP: 153/63 (07-13-23 @ 23:50) (128/57 - 153/63)  RR: 16 (07-13-23 @ 23:50) (16 - 18)  SpO2: 100% (07-13-23 @ 23:50) (97% - 100%)  Wt(kg): --  ICU Vital Signs Last 24 Hrs  T(C): 36.7 (13 Jul 2023 23:50), Max: 36.8 (13 Jul 2023 19:44)  T(F): 98.1 (13 Jul 2023 23:50), Max: 98.3 (13 Jul 2023 19:44)  HR: 78 (13 Jul 2023 23:50) (75 - 81)  BP: 153/63 (13 Jul 2023 23:50) (128/57 - 153/63)  BP(mean): --  ABP: --  ABP(mean): --  RR: 16 (13 Jul 2023 23:50) (16 - 18)  SpO2: 100% (13 Jul 2023 23:50) (97% - 100%)    O2 Parameters below as of 13 Jul 2023 23:50  Patient On (Oxygen Delivery Method): room air          I&O's Summary        Appearance: Normal	  HEENT:   Normal oral mucosa	  Cardiovascular: Normal S1 S2, No JVD, No murmurs, No edema  Respiratory: Lungs clear to auscultation	  Psychiatry: A & O x 3, Mood & affect appropriate  Gastrointestinal:  Soft, Non-tender, + BS	  Skin: No rashes, No ecchymoses, No cyanosis	  Neurologic: Non-focal  Extremities: Warm and well perfused. 2+ pulses bilaterally        LABS:	 	    CBC Full  -  ( 13 Jul 2023 21:30 )  WBC Count : 4.83 K/uL  Hemoglobin : 11.5 g/dL  Hematocrit : 34.9 %  Platelet Count - Automated : 147 K/uL  Mean Cell Volume : 89.0 fL  Mean Cell Hemoglobin : 29.3 pg  Mean Cell Hemoglobin Concentration : 33.0 gm/dL  Auto Neutrophil # : 3.20 K/uL  Auto Lymphocyte # : 1.07 K/uL  Auto Monocyte # : 0.41 K/uL  Auto Eosinophil # : 0.10 K/uL  Auto Basophil # : 0.04 K/uL  Auto Neutrophil % : 66.2 %  Auto Lymphocyte % : 22.2 %  Auto Monocyte % : 8.5 %  Auto Eosinophil % : 2.1 %  Auto Basophil % : 0.8 %    07-13    142  |  106  |  28<H>  ----------------------------<  142<H>  3.7   |  21<L>  |  1.29    Ca    9.3      13 Jul 2023 21:30  Mg     1.60     07-13    TPro  6.3  /  Alb  3.8  /  TBili  0.6  /  DBili  x   /  AST  33<H>  /  ALT  16  /  AlkPhos  81  07-13      proBNP:   Lipid Profile:   HgA1c:   TSH:     CARDIAC MARKERS:            Creatine Kinase, Serum: 197 U/L (07-13-23 @ 23:50)      TELEMETRY: 	    ECG:  	    PREVIOUS DIAGNOSTIC TESTING:    [ ] Echocardiogram:  [ ]  Catheterization:  [ ] Stress Test:  	   CHIEF COMPLAINT: +Chest pain    HISTORY OF PRESENT ILLNESS:      Allergies  No Known Allergies    Intolerances    	    MEDICATIONS:                  PAST MEDICAL & SURGICAL HISTORY:  HTN (hypertension)      HLD (hyperlipidemia)          FAMILY HISTORY:      SOCIAL HISTORY:    [ ] Non-smoker  [ ] Smoker  [ ] Alcohol  [ ] Denies Alcohol      REVIEW OF SYSTEMS:  CONSTITUTIONAL: no fevers or chills  EYES/ENT: No visual changes; No vertigo or throat pain  NECK: No JVD  RESPIRATORY:  No cough, wheezing, chills or hemoptysis, SOB  CARDIOVASCULAR: No chest pain, palpitations, passing out, dizziness, or leg swelling  GASTROINTESTINAL: No abdominal or epigastric pain. No nausea/vomiting/melena  Genitourinary: No dysuria, frequency or hematuria  NEUROLOGICAL: No numbness or weakness  SKIN: No itching, burning, rashes or lesions      PHYSICAL EXAM:  T(C): 36.7 (07-13-23 @ 23:50), Max: 36.8 (07-13-23 @ 19:44)  HR: 78 (07-13-23 @ 23:50) (75 - 81)  BP: 153/63 (07-13-23 @ 23:50) (128/57 - 153/63)  RR: 16 (07-13-23 @ 23:50) (16 - 18)  SpO2: 100% (07-13-23 @ 23:50) (97% - 100%)  Wt(kg): --  ICU Vital Signs Last 24 Hrs  T(C): 36.7 (13 Jul 2023 23:50), Max: 36.8 (13 Jul 2023 19:44)  T(F): 98.1 (13 Jul 2023 23:50), Max: 98.3 (13 Jul 2023 19:44)  HR: 78 (13 Jul 2023 23:50) (75 - 81)  BP: 153/63 (13 Jul 2023 23:50) (128/57 - 153/63)  BP(mean): --  ABP: --  ABP(mean): --  RR: 16 (13 Jul 2023 23:50) (16 - 18)  SpO2: 100% (13 Jul 2023 23:50) (97% - 100%)    O2 Parameters below as of 13 Jul 2023 23:50  Patient On (Oxygen Delivery Method): room air          I&O's Summary        Appearance: Normal	  HEENT:   Normal oral mucosa	  Cardiovascular: Normal S1 S2, No JVD, No murmurs, No edema  Respiratory: Lungs clear to auscultation	  Psychiatry: A & O x 3, Mood & affect appropriate  Gastrointestinal:  Soft, Non-tender, + BS	  Skin: No rashes, No ecchymoses, No cyanosis	  Neurologic: Non-focal  Extremities: Warm and well perfused. 2+ pulses bilaterally        LABS:	 	    CBC Full  -  ( 13 Jul 2023 21:30 )  WBC Count : 4.83 K/uL  Hemoglobin : 11.5 g/dL  Hematocrit : 34.9 %  Platelet Count - Automated : 147 K/uL  Mean Cell Volume : 89.0 fL  Mean Cell Hemoglobin : 29.3 pg  Mean Cell Hemoglobin Concentration : 33.0 gm/dL  Auto Neutrophil # : 3.20 K/uL  Auto Lymphocyte # : 1.07 K/uL  Auto Monocyte # : 0.41 K/uL  Auto Eosinophil # : 0.10 K/uL  Auto Basophil # : 0.04 K/uL  Auto Neutrophil % : 66.2 %  Auto Lymphocyte % : 22.2 %  Auto Monocyte % : 8.5 %  Auto Eosinophil % : 2.1 %  Auto Basophil % : 0.8 %    07-13    142  |  106  |  28<H>  ----------------------------<  142<H>  3.7   |  21<L>  |  1.29    Ca    9.3      13 Jul 2023 21:30  Mg     1.60     07-13    TPro  6.3  /  Alb  3.8  /  TBili  0.6  /  DBili  x   /  AST  33<H>  /  ALT  16  /  AlkPhos  81  07-13      proBNP:   Lipid Profile:   HgA1c:   TSH:     CARDIAC MARKERS:            Creatine Kinase, Serum: 197 U/L (07-13-23 @ 23:50)      TELEMETRY: 	    ECG:  	    PREVIOUS DIAGNOSTIC TESTING:    [ ] Echocardiogram:  [ ]  Catheterization:  [ ] Stress Test:  	   CHIEF COMPLAINT: +Chest pain    HISTORY OF PRESENT ILLNESS:  Patient is a 89 year old Yakut speaking female with PMHx HTN HLD with known LBBB as per pt granddaughter at bedside presenting from home complaining of chest pain. Cardiology consulted for troponinemia elevated to 120 and for pt complaining of chest tightness. Pt states the pain began ~4 pm 7/13 as persistent midsternal chest tightness with radiation to R shoulder and SOB that occurred while ambulating outside. On interview, pt states she still feels chest discomfort but notes degree of pain has improved. Patient denies history of CAD, no travel/prolonged mobility, denies cardiac surgical history, and follows with outpatient cardiologist. Pt denies abd pain, N/V, HA.  ED- Labs significant for elevated cardiac enzymes trop 120 -> 182, , CKMB 11, CPK 5.6, EKG significant for NSR with LBBB and 1st degree AV block without acute ST-T deviations, s/p  mg x1    Allergies  No Known Allergies  	    MEDICATIONS:  On fosinopril 40 mg QD, HCTZ 25 mg QD, labetalol 100 mg BID for HTN                PAST MEDICAL & SURGICAL HISTORY:  HTN (hypertension)  HLD (hyperlipidemia)  LBBB        FAMILY HISTORY:      SOCIAL HISTORY:    [ x] Non-smoker  [ ] Smoker  [ ] Alcohol  [x] Denies Alcohol      REVIEW OF SYSTEMS:  CONSTITUTIONAL: no fevers or chills  EYES/ENT: No visual changes; No vertigo or throat pain  NECK: No JVD  RESPIRATORY: +SOB No cough, wheezing, chills or hemoptysis.  CARDIOVASCULAR: +Chest pain, palpitations, passing out, dizziness, or leg swelling  GASTROINTESTINAL: No abdominal or epigastric pain. No nausea/vomiting/melena  Genitourinary: No dysuria, frequency or hematuria  NEUROLOGICAL: No numbness or weakness  SKIN: No itching, burning, rashes or lesions      PHYSICAL EXAM:  T(C): 36.7 (07-13-23 @ 23:50), Max: 36.8 (07-13-23 @ 19:44)  HR: 78 (07-13-23 @ 23:50) (75 - 81)  BP: 153/63 (07-13-23 @ 23:50) (128/57 - 153/63)  RR: 16 (07-13-23 @ 23:50) (16 - 18)  SpO2: 100% (07-13-23 @ 23:50) (97% - 100%)  Wt(kg): --  ICU Vital Signs Last 24 Hrs  T(C): 36.7 (13 Jul 2023 23:50), Max: 36.8 (13 Jul 2023 19:44)  T(F): 98.1 (13 Jul 2023 23:50), Max: 98.3 (13 Jul 2023 19:44)  HR: 78 (13 Jul 2023 23:50) (75 - 81)  BP: 153/63 (13 Jul 2023 23:50) (128/57 - 153/63)  RR: 16 (13 Jul 2023 23:50) (16 - 18)  SpO2: 100% (13 Jul 2023 23:50) (97% - 100%)    O2 Parameters below as of 13 Jul 2023 23:50  Patient On (Oxygen Delivery Method): room air          I&O's Summary        Appearance: Normal	  HEENT: Normal oral mucosa	  Cardiovascular: Normal S1 S2, No JVD, No murmurs, No edema  Respiratory: Lungs clear to auscultation	  Psychiatry: A & O x 3, Mood & affect appropriate  Gastrointestinal:  Soft, Non-tender, + BS	  Skin: No rashes, No ecchymoses, No cyanosis	  Neurologic: Non-focal  Extremities: Warm and well perfused. 2+ pulses bilaterally        LABS:	 	    CBC Full  -  ( 13 Jul 2023 21:30 )  WBC Count : 4.83 K/uL  Hemoglobin : 11.5 g/dL  Hematocrit : 34.9 %  Platelet Count - Automated : 147 K/uL  Mean Cell Volume : 89.0 fL  Mean Cell Hemoglobin : 29.3 pg  Mean Cell Hemoglobin Concentration : 33.0 gm/dL  Auto Neutrophil # : 3.20 K/uL  Auto Lymphocyte # : 1.07 K/uL  Auto Monocyte # : 0.41 K/uL  Auto Eosinophil # : 0.10 K/uL  Auto Basophil # : 0.04 K/uL  Auto Neutrophil % : 66.2 %  Auto Lymphocyte % : 22.2 %  Auto Monocyte % : 8.5 %  Auto Eosinophil % : 2.1 %  Auto Basophil % : 0.8 %    07-13    142  |  106  |  28<H>  ----------------------------<  142<H>  3.7   |  21<L>  |  1.29    Ca    9.3      13 Jul 2023 21:30  Mg     1.60     07-13    TPro  6.3  /  Alb  3.8  /  TBili  0.6  /  DBili  x   /  AST  33<H>  /  ALT  16  /  AlkPhos  81  07-13      proBNP: 697  Lipid Profile:   HgA1c:   TSH:     CARDIAC MARKERS:  Troponin 120 -> 182    CKMB 11  CPK 5.6          Creatine Kinase, Serum: 197 U/L (07-13-23 @ 23:50)      TELEMETRY: 	    ECG: Sinus rhythm with LBBB and 1st degree AV block without acute ST-T changes    PREVIOUS DIAGNOSTIC TESTING:    Xray Chest 1 View AP/PA (07.13.23 @ 21:28)  ******PRELIMINARY REPORT******           INTERPRETATION:  CLINICAL INFORMATION: Chest pain    TECHNIQUE: Frontal radiograph of the chest    COMPARISON: None available.    FINDINGS:  Elevation of the right hemidiaphragm. No focal consolidation. No pleural   effusion. No pneumothorax.Cardiac silhouette size cannot be accurately   assessed on this projection. No acute osseous findings.    IMPRESSION:    No focal consolidation.    [ ] Echocardiogram  [ ]  Catheterization:  [ ] Stress Test:  	   CHIEF COMPLAINT: +Chest pain    HISTORY OF PRESENT ILLNESS:  Patient is a 89 year old Armenian speaking female with PMHx HTN HLD with known LBBB as per pt granddaughter at bedside presenting from home complaining of chest pain. Cardiology consulted for troponinemia elevated to 120 and for pt complaining of chest tightness. Pt states the pain began ~4 pm 7/13 as persistent midsternal chest tightness with radiation to R shoulder and SOB that occurred while ambulating outside. On interview, pt states she still feels chest discomfort but notes degree of pain has improved. Patient denies history of CAD, no travel/prolonged mobility, denies cardiac surgical history, and follows with outpatient cardiologist. Pt denies abd pain, N/V, HA.  ED- Labs significant for elevated cardiac enzymes trop 120 -> 182, , CKMB 11, CPK 5.6, EKG significant for NSR with LBBB and 1st degree AV block without acute ST-T deviations, s/p  mg x1    Allergies  No Known Allergies  	    MEDICATIONS:  On fosinopril 40 mg QD, HCTZ 25 mg QD, labetalol 100 mg BID for HTN                PAST MEDICAL & SURGICAL HISTORY:  HTN (hypertension)  HLD (hyperlipidemia)  LBBB        FAMILY HISTORY:      SOCIAL HISTORY:    [ x] Non-smoker  [ ] Smoker  [ ] Alcohol  [x] Denies Alcohol      REVIEW OF SYSTEMS:  CONSTITUTIONAL: no fevers or chills  EYES/ENT: No visual changes; No vertigo or throat pain  NECK: No JVD  RESPIRATORY: +SOB No cough, wheezing, chills or hemoptysis.  CARDIOVASCULAR: +Chest pain, palpitations, passing out, dizziness, or leg swelling  GASTROINTESTINAL: No abdominal or epigastric pain. No nausea/vomiting/melena  Genitourinary: No dysuria, frequency or hematuria  NEUROLOGICAL: No numbness or weakness  SKIN: No itching, burning, rashes or lesions      PHYSICAL EXAM:  T(C): 36.7 (07-13-23 @ 23:50), Max: 36.8 (07-13-23 @ 19:44)  HR: 78 (07-13-23 @ 23:50) (75 - 81)  BP: 153/63 (07-13-23 @ 23:50) (128/57 - 153/63)  RR: 16 (07-13-23 @ 23:50) (16 - 18)  SpO2: 100% (07-13-23 @ 23:50) (97% - 100%)  Wt(kg): --  ICU Vital Signs Last 24 Hrs  T(C): 36.7 (13 Jul 2023 23:50), Max: 36.8 (13 Jul 2023 19:44)  T(F): 98.1 (13 Jul 2023 23:50), Max: 98.3 (13 Jul 2023 19:44)  HR: 78 (13 Jul 2023 23:50) (75 - 81)  BP: 153/63 (13 Jul 2023 23:50) (128/57 - 153/63)  RR: 16 (13 Jul 2023 23:50) (16 - 18)  SpO2: 100% (13 Jul 2023 23:50) (97% - 100%)    O2 Parameters below as of 13 Jul 2023 23:50  Patient On (Oxygen Delivery Method): room air          I&O's Summary        Appearance: Normal	  HEENT: Normal oral mucosa	  Cardiovascular: Normal S1 S2, No JVD, No murmurs, No edema  Respiratory: Lungs clear to auscultation	  Psychiatry: A & O x 3, Mood & affect appropriate  Gastrointestinal:  Soft, Non-tender, + BS	  Skin: No rashes, No ecchymoses, No cyanosis	  Neurologic: Non-focal  Extremities: Warm and well perfused. 2+ pulses bilaterally        LABS:	 	    CBC Full  -  ( 13 Jul 2023 21:30 )  WBC Count : 4.83 K/uL  Hemoglobin : 11.5 g/dL  Hematocrit : 34.9 %  Platelet Count - Automated : 147 K/uL  Mean Cell Volume : 89.0 fL  Mean Cell Hemoglobin : 29.3 pg  Mean Cell Hemoglobin Concentration : 33.0 gm/dL  Auto Neutrophil # : 3.20 K/uL  Auto Lymphocyte # : 1.07 K/uL  Auto Monocyte # : 0.41 K/uL  Auto Eosinophil # : 0.10 K/uL  Auto Basophil # : 0.04 K/uL  Auto Neutrophil % : 66.2 %  Auto Lymphocyte % : 22.2 %  Auto Monocyte % : 8.5 %  Auto Eosinophil % : 2.1 %  Auto Basophil % : 0.8 %    07-13    142  |  106  |  28<H>  ----------------------------<  142<H>  3.7   |  21<L>  |  1.29    Ca    9.3      13 Jul 2023 21:30  Mg     1.60     07-13    TPro  6.3  /  Alb  3.8  /  TBili  0.6  /  DBili  x   /  AST  33<H>  /  ALT  16  /  AlkPhos  81  07-13      proBNP: 697  Lipid Profile:   HgA1c:   TSH:     CARDIAC MARKERS:  Troponin 120 -> 182    CKMB 11  CPK 5.6          Creatine Kinase, Serum: 197 U/L (07-13-23 @ 23:50)      TELEMETRY: 	    ECG: Sinus rhythm with LBBB and 1st degree AV block without acute ST-T changes    PREVIOUS DIAGNOSTIC TESTING:    Xray Chest 1 View AP/PA (07.13.23 @ 21:28)  ******PRELIMINARY REPORT******           INTERPRETATION:  CLINICAL INFORMATION: Chest pain    TECHNIQUE: Frontal radiograph of the chest    COMPARISON: None available.    FINDINGS:  Elevation of the right hemidiaphragm. No focal consolidation. No pleural   effusion. No pneumothorax.Cardiac silhouette size cannot be accurately   assessed on this projection. No acute osseous findings.    IMPRESSION:    No focal consolidation.    [ ] Echocardiogram  [ ]  Catheterization:  [ ] Stress Test:  	   CHIEF COMPLAINT: +Chest pain    HISTORY OF PRESENT ILLNESS:  Patient is a 89 year old Mongolian speaking female with PMHx HTN HLD with known LBBB as per pt granddaughter at bedside presenting from home complaining of chest pain. Cardiology consulted for troponinemia elevated to 120 and for pt complaining of chest tightness. Pt states the pain began ~4 pm 7/13 as persistent midsternal chest tightness with radiation to R shoulder and SOB that occurred while ambulating outside. On interview, pt states she still feels chest discomfort but notes degree of pain has improved. Patient denies history of CAD, no travel/prolonged mobility, denies cardiac surgical history, and follows with outpatient cardiologist. Pt denies abd pain, N/V, HA.  ED- Labs significant for elevated cardiac enzymes trop 120 -> 182, , CKMB 11, CPK 5.6, EKG significant for NSR with LBBB and 1st degree AV block without acute ST-T deviations, s/p  mg x1    Allergies  No Known Allergies  	    MEDICATIONS:  On fosinopril 40 mg QD, HCTZ 25 mg QD, labetalol 100 mg BID for HTN                PAST MEDICAL & SURGICAL HISTORY:  HTN (hypertension)  HLD (hyperlipidemia)  LBBB        FAMILY HISTORY:      SOCIAL HISTORY:    [ x] Non-smoker  [ ] Smoker  [ ] Alcohol  [x] Denies Alcohol      REVIEW OF SYSTEMS:  CONSTITUTIONAL: no fevers or chills  EYES/ENT: No visual changes; No vertigo or throat pain  NECK: No JVD  RESPIRATORY: +SOB No cough, wheezing, chills or hemoptysis.  CARDIOVASCULAR: +Chest pain, palpitations, passing out, dizziness, or leg swelling  GASTROINTESTINAL: No abdominal or epigastric pain. No nausea/vomiting/melena  Genitourinary: No dysuria, frequency or hematuria  NEUROLOGICAL: No numbness or weakness  SKIN: No itching, burning, rashes or lesions      PHYSICAL EXAM:  T(C): 36.7 (07-13-23 @ 23:50), Max: 36.8 (07-13-23 @ 19:44)  HR: 78 (07-13-23 @ 23:50) (75 - 81)  BP: 153/63 (07-13-23 @ 23:50) (128/57 - 153/63)  RR: 16 (07-13-23 @ 23:50) (16 - 18)  SpO2: 100% (07-13-23 @ 23:50) (97% - 100%)  Wt(kg): --  ICU Vital Signs Last 24 Hrs  T(C): 36.7 (13 Jul 2023 23:50), Max: 36.8 (13 Jul 2023 19:44)  T(F): 98.1 (13 Jul 2023 23:50), Max: 98.3 (13 Jul 2023 19:44)  HR: 78 (13 Jul 2023 23:50) (75 - 81)  BP: 153/63 (13 Jul 2023 23:50) (128/57 - 153/63)  RR: 16 (13 Jul 2023 23:50) (16 - 18)  SpO2: 100% (13 Jul 2023 23:50) (97% - 100%)    O2 Parameters below as of 13 Jul 2023 23:50  Patient On (Oxygen Delivery Method): room air          I&O's Summary        Appearance: Normal	  HEENT: Normal oral mucosa	  Cardiovascular: Normal S1 S2, No JVD, No murmurs, No edema  Respiratory: Lungs clear to auscultation	  Psychiatry: A & O x 3, Mood & affect appropriate  Gastrointestinal:  Soft, Non-tender, + BS	  Skin: No rashes, No ecchymoses, No cyanosis	  Neurologic: Non-focal  Extremities: Warm and well perfused. 2+ pulses bilaterally        LABS:	 	    CBC Full  -  ( 13 Jul 2023 21:30 )  WBC Count : 4.83 K/uL  Hemoglobin : 11.5 g/dL  Hematocrit : 34.9 %  Platelet Count - Automated : 147 K/uL  Mean Cell Volume : 89.0 fL  Mean Cell Hemoglobin : 29.3 pg  Mean Cell Hemoglobin Concentration : 33.0 gm/dL  Auto Neutrophil # : 3.20 K/uL  Auto Lymphocyte # : 1.07 K/uL  Auto Monocyte # : 0.41 K/uL  Auto Eosinophil # : 0.10 K/uL  Auto Basophil # : 0.04 K/uL  Auto Neutrophil % : 66.2 %  Auto Lymphocyte % : 22.2 %  Auto Monocyte % : 8.5 %  Auto Eosinophil % : 2.1 %  Auto Basophil % : 0.8 %    07-13    142  |  106  |  28<H>  ----------------------------<  142<H>  3.7   |  21<L>  |  1.29    Ca    9.3      13 Jul 2023 21:30  Mg     1.60     07-13    TPro  6.3  /  Alb  3.8  /  TBili  0.6  /  DBili  x   /  AST  33<H>  /  ALT  16  /  AlkPhos  81  07-13      proBNP: 697  Lipid Profile:   HgA1c:   TSH:     CARDIAC MARKERS:  Troponin 120 -> 182    CKMB 11  CPK 5.6          Creatine Kinase, Serum: 197 U/L (07-13-23 @ 23:50)      TELEMETRY: 	    ECG: Sinus rhythm with LBBB and 1st degree AV block without acute ST-T changes    PREVIOUS DIAGNOSTIC TESTING:    Xray Chest 1 View AP/PA (07.13.23 @ 21:28)  ******PRELIMINARY REPORT******           INTERPRETATION:  CLINICAL INFORMATION: Chest pain    TECHNIQUE: Frontal radiograph of the chest    COMPARISON: None available.    FINDINGS:  Elevation of the right hemidiaphragm. No focal consolidation. No pleural   effusion. No pneumothorax.Cardiac silhouette size cannot be accurately   assessed on this projection. No acute osseous findings.    IMPRESSION:    No focal consolidation.    [ ] Echocardiogram  [ ]  Catheterization:  [ ] Stress Test:

## 2023-07-14 NOTE — PROGRESS NOTE ADULT - SUBJECTIVE AND OBJECTIVE BOX
[facility] Division of [facility] Medicine  Meaghan Fuentes MD EdM  Pager [phone]  Also available on MS Teams    Seen with patient's granddaughter at bedside who served as . Patient chose to use granddaughter over  over the phone.    SUBJECTIVE / OVERNIGHT EVENTS:  No events overnight  Not having any chest pain or shortness of breath    MEDICATIONS  (STANDING):  aspirin  chewable 81 milliGRAM(s) Oral daily  atorvastatin 80 milliGRAM(s) Oral at bedtime  clopidogrel Tablet 75 milliGRAM(s) Oral daily  famotidine    Tablet 10 milliGRAM(s) Oral every 48 hours  heparin  Infusion.  Unit(s)/Hr (7.5 mL/Hr) IV Continuous <Continuous>  potassium chloride   Powder 40 milliEquivalent(s) Oral once    MEDICATIONS  (PRN):  acetaminophen     Tablet .. 650 milliGRAM(s) Oral every 6 hours PRN Mild Pain (1 - 3)  heparin   Injectable 3800 Unit(s) IV Push every 6 hours PRN For aPTT less than 40  melatonin 3 milliGRAM(s) Oral at bedtime PRN Insomnia      I&O's Summary    13 Jul 2023 07:01  -  14 Jul 2023 07:00  --------------------------------------------------------  IN: 30 mL / OUT: 0 mL / NET: 30 mL        PHYSICAL EXAM:  Vital Signs Last 24 Hrs  T(C): 36.5 (14 Jul 2023 11:36), Max: 36.9 (14 Jul 2023 05:09)  T(F): 97.7 (14 Jul 2023 11:36), Max: 98.4 (14 Jul 2023 05:09)  HR: 79 (14 Jul 2023 11:36) (75 - 83)  BP: 136/68 (14 Jul 2023 11:36) (128/57 - 153/63)  BP(mean): --  RR: 18 (14 Jul 2023 11:36) (16 - 18)  SpO2: 99% (14 Jul 2023 11:36) (97% - 100%)    Parameters below as of 14 Jul 2023 11:36  Patient On (Oxygen Delivery Method): room air      CONSTITUTIONAL: no acute distress, conversant  EYES: conjunctiva and sclera clear  ENMT: Moist oral mucosa  RESPIRATORY: Normal respiratory effort; lungs are clear to auscultation bilaterally  CARDIOVASCULAR: Regular rate and rhythm, normal S1 and S2, no murmur/rub/gallop; No lower extremity edema  ABDOMEN: no tenderness to palpation, normoactive bowel sounds, no rebound/guarding  PSYCH: Alert; affect appropriate  NEUROLOGY: CN 2-12 are intact and symmetric; moving all extremities   SKIN: No rashes on examined skin    LABS:                        11.5   5.07  )-----------( 141      ( 14 Jul 2023 09:00 )             34.6     07-14    142  |  107  |  28<H>  ----------------------------<  90  3.3<L>   |  21<L>  |  1.23    Ca    9.2      14 Jul 2023 04:40  Phos  3.2     07-14  Mg     1.60     07-14    TPro  6.4  /  Alb  3.7  /  TBili  1.0  /  DBili  x   /  AST  32  /  ALT  17  /  AlkPhos  64  07-14    PT/INR - ( 14 Jul 2023 04:40 )   PT: 15.0 sec;   INR: 1.29 ratio         PTT - ( 14 Jul 2023 09:00 )  PTT:181.0 sec  CARDIAC MARKERS ( 14 Jul 2023 04:40 )  x     / x     / 194 U/L / x     / 14.2 ng/mL  CARDIAC MARKERS ( 13 Jul 2023 23:50 )  x     / x     / 197 U/L / x     / 11.0 ng/mL      Urinalysis Basic - ( 14 Jul 2023 04:40 )    Color: x / Appearance: x / SG: x / pH: x  Gluc: 90 mg/dL / Ketone: x  / Bili: x / Urobili: x   Blood: x / Protein: x / Nitrite: x   Leuk Esterase: x / RBC: x / WBC x   Sq Epi: x / Non Sq Epi: x / Bacteria: x        COORDINATION OF CARE:  Consultant Communication and Details of Discussion (where applicable): Discussed with interdisciplinary team at IDRs    ASSESSMENT FROM INDEPENDENT HISTORIAN:  Discussed with patient's granddaughter at bedside   [facility] Division of [facility] Medicine  Meaghan Fuentes MD EdM  Pager [phone]  Also available on MS Teams    Seen with patient's granddaughter at bedside who served as . Patient chose to use granddaughter over  over the phone.    SUBJECTIVE / OVERNIGHT EVENTS:  No events overnight  Not having any chest pain or shortness of breath    MEDICATIONS  (STANDING):  aspirin  chewable 81 milliGRAM(s) Oral daily  atorvastatin 80 milliGRAM(s) Oral at bedtime  clopidogrel Tablet 75 milliGRAM(s) Oral daily  famotidine    Tablet 10 milliGRAM(s) Oral every 48 hours  heparin  Infusion.  Unit(s)/Hr (7.5 mL/Hr) IV Continuous <Continuous>  potassium chloride   Powder 40 milliEquivalent(s) Oral once    MEDICATIONS  (PRN):  acetaminophen     Tablet .. 650 milliGRAM(s) Oral every 6 hours PRN Mild Pain (1 - 3)  heparin   Injectable 3800 Unit(s) IV Push every 6 hours PRN For aPTT less than 40  melatonin 3 milliGRAM(s) Oral at bedtime PRN Insomnia      I&O's Summary    13 Jul 2023 07:01  -  14 Jul 2023 07:00  --------------------------------------------------------  IN: 30 mL / OUT: 0 mL / NET: 30 mL        PHYSICAL EXAM:  Vital Signs Last 24 Hrs  T(C): 36.5 (14 Jul 2023 11:36), Max: 36.9 (14 Jul 2023 05:09)  T(F): 97.7 (14 Jul 2023 11:36), Max: 98.4 (14 Jul 2023 05:09)  HR: 79 (14 Jul 2023 11:36) (75 - 83)  BP: 136/68 (14 Jul 2023 11:36) (128/57 - 153/63)  BP(mean): --  RR: 18 (14 Jul 2023 11:36) (16 - 18)  SpO2: 99% (14 Jul 2023 11:36) (97% - 100%)    Parameters below as of 14 Jul 2023 11:36  Patient On (Oxygen Delivery Method): room air      CONSTITUTIONAL: no acute distress, conversant  EYES: conjunctiva and sclera clear  ENMT: Moist oral mucosa  RESPIRATORY: Normal respiratory effort; lungs are clear to auscultation bilaterally  CARDIOVASCULAR: Regular rate and rhythm, normal S1 and S2, no murmur/rub/gallop; No lower extremity edema  ABDOMEN: no tenderness to palpation, normoactive bowel sounds, no rebound/guarding  PSYCH: Alert; affect appropriate  NEUROLOGY: CN 2-12 are intact and symmetric; moving all extremities   SKIN: No rashes on examined skin    LABS:                        11.5   5.07  )-----------( 141      ( 14 Jul 2023 09:00 )             34.6     07-14    142  |  107  |  28<H>  ----------------------------<  90  3.3<L>   |  21<L>  |  1.23    Ca    9.2      14 Jul 2023 04:40  Phos  3.2     07-14  Mg     1.60     07-14    TPro  6.4  /  Alb  3.7  /  TBili  1.0  /  DBili  x   /  AST  32  /  ALT  17  /  AlkPhos  64  07-14    PT/INR - ( 14 Jul 2023 04:40 )   PT: 15.0 sec;   INR: 1.29 ratio         PTT - ( 14 Jul 2023 09:00 )  PTT:181.0 sec  CARDIAC MARKERS ( 14 Jul 2023 04:40 )  x     / x     / 194 U/L / x     / 14.2 ng/mL  CARDIAC MARKERS ( 13 Jul 2023 23:50 )  x     / x     / 197 U/L / x     / 11.0 ng/mL      Urinalysis Basic - ( 14 Jul 2023 04:40 )    Color: x / Appearance: x / SG: x / pH: x  Gluc: 90 mg/dL / Ketone: x  / Bili: x / Urobili: x   Blood: x / Protein: x / Nitrite: x   Leuk Esterase: x / RBC: x / WBC x   Sq Epi: x / Non Sq Epi: x / Bacteria: x        COORDINATION OF CARE:  Consultant Communication and Details of Discussion (where applicable): Discussed with interdisciplinary team at IDRs    ASSESSMENT FROM INDEPENDENT HISTORIAN:  Discussed with patient's granddaughter at bedside   [facility] Division of [facility] Medicine  Meaghan Fuentes MD EdM  Pager [phone]  Also available on MS Teams    Seen with patient's granddaughter at bedside who served as . Patient chose to use granddaughter over  over the phone.    SUBJECTIVE / OVERNIGHT EVENTS:  No events overnight  Not having any chest pain or shortness of breath    MEDICATIONS  (STANDING):  aspirin  chewable 81 milliGRAM(s) Oral daily  atorvastatin 80 milliGRAM(s) Oral at bedtime  clopidogrel Tablet 75 milliGRAM(s) Oral daily  famotidine    Tablet 10 milliGRAM(s) Oral every 48 hours  heparin  Infusion.  Unit(s)/Hr (7.5 mL/Hr) IV Continuous <Continuous>  potassium chloride   Powder 40 milliEquivalent(s) Oral once    MEDICATIONS  (PRN):  acetaminophen     Tablet .. 650 milliGRAM(s) Oral every 6 hours PRN Mild Pain (1 - 3)  heparin   Injectable 3800 Unit(s) IV Push every 6 hours PRN For aPTT less than 40  melatonin 3 milliGRAM(s) Oral at bedtime PRN Insomnia      I&O's Summary    13 Jul 2023 07:01  -  14 Jul 2023 07:00  --------------------------------------------------------  IN: 30 mL / OUT: 0 mL / NET: 30 mL        PHYSICAL EXAM:  Vital Signs Last 24 Hrs  T(C): 36.5 (14 Jul 2023 11:36), Max: 36.9 (14 Jul 2023 05:09)  T(F): 97.7 (14 Jul 2023 11:36), Max: 98.4 (14 Jul 2023 05:09)  HR: 79 (14 Jul 2023 11:36) (75 - 83)  BP: 136/68 (14 Jul 2023 11:36) (128/57 - 153/63)  BP(mean): --  RR: 18 (14 Jul 2023 11:36) (16 - 18)  SpO2: 99% (14 Jul 2023 11:36) (97% - 100%)    Parameters below as of 14 Jul 2023 11:36  Patient On (Oxygen Delivery Method): room air      CONSTITUTIONAL: no acute distress, conversant  EYES: conjunctiva and sclera clear  ENMT: Moist oral mucosa  RESPIRATORY: Normal respiratory effort; lungs are clear to auscultation bilaterally  CARDIOVASCULAR: Regular rate and rhythm, normal S1 and S2, no murmur/rub/gallop; No lower extremity edema  ABDOMEN: no tenderness to palpation, normoactive bowel sounds, no rebound/guarding  PSYCH: Alert; affect appropriate  NEUROLOGY: CN 2-12 are intact and symmetric; moving all extremities   SKIN: No rashes on examined skin    LABS:                        11.5   5.07  )-----------( 141      ( 14 Jul 2023 09:00 )             34.6     07-14    142  |  107  |  28<H>  ----------------------------<  90  3.3<L>   |  21<L>  |  1.23    Ca    9.2      14 Jul 2023 04:40  Phos  3.2     07-14  Mg     1.60     07-14    TPro  6.4  /  Alb  3.7  /  TBili  1.0  /  DBili  x   /  AST  32  /  ALT  17  /  AlkPhos  64  07-14    PT/INR - ( 14 Jul 2023 04:40 )   PT: 15.0 sec;   INR: 1.29 ratio         PTT - ( 14 Jul 2023 09:00 )  PTT:181.0 sec  CARDIAC MARKERS ( 14 Jul 2023 04:40 )  x     / x     / 194 U/L / x     / 14.2 ng/mL  CARDIAC MARKERS ( 13 Jul 2023 23:50 )  x     / x     / 197 U/L / x     / 11.0 ng/mL      Urinalysis Basic - ( 14 Jul 2023 04:40 )    Color: x / Appearance: x / SG: x / pH: x  Gluc: 90 mg/dL / Ketone: x  / Bili: x / Urobili: x   Blood: x / Protein: x / Nitrite: x   Leuk Esterase: x / RBC: x / WBC x   Sq Epi: x / Non Sq Epi: x / Bacteria: x        COORDINATION OF CARE:  Consultant Communication and Details of Discussion (where applicable): Discussed with interdisciplinary team at IDRs    ASSESSMENT FROM INDEPENDENT HISTORIAN:  Discussed with patient's granddaughter at bedside

## 2023-07-14 NOTE — H&P ADULT - HISTORY OF PRESENT ILLNESS
Ms. Petersen is a 89 year old F with history of HTN, HLD, with known LBBB who presents with chest pain that started yesterday afternoon. She reports severe sharp pain in the mid-sternal chest tightness that radiated to the right shoulder and shortness of breath occurred with ambulating outside. Denies numbness/tingling, nausea, lightheadedness, palpitations. She reports associated dyspnea. She reports she doesn't sleep without pillows. She reports she has LE edema to both legs periodically. She was given 600 mg of plavix x1 time,  mg x1 dose, started on heparin drip. She reports she has never had chest pain or shortness of breath like this before. Denies any change of the chest pain and shortness of breath with exertion or any change with respiration.     Labs were sig for the following: troponin 120-> 182 , , CKMB 11, pro- and LA 1.8. EKG sig for NSR with LBBB and 1st degree heart block with ST/T changes. She reports she has had vertigo for the past couple of months, denies any present during episode yesterday afternoon.

## 2023-07-14 NOTE — PROVIDER CONTACT NOTE (OTHER) - SITUATION
4 beats vtach
Hematoma noted to R IV site, IV removed
Scant amount of bleeding noted on toilet paper post voiding

## 2023-07-14 NOTE — DISCHARGE NOTE PROVIDER - NSDCMRMEDTOKEN_GEN_ALL_CORE_FT
calcium (as carbonate) 500 mg oral tablet: orally once a day  fosinopril 40 mg oral tablet: 1 tab(s) orally once a day  hydroCHLOROthiazide 25 mg oral tablet: 1 tab(s) orally once a day  labetalol 100 mg oral tablet: 1 tab(s) orally 2 times a day   acetaminophen 325 mg oral tablet: 2 tab(s) orally every 6 hours As needed Mild Pain (1 - 3)  aspirin 81 mg oral tablet, chewable: 1 tab(s) orally once a day  atorvastatin 80 mg oral tablet: 1 tab(s) orally once a day (at bedtime)  clopidogrel 75 mg oral tablet: 1 tab(s) orally once a day  famotidine 10 mg oral tablet: 1 tab(s) orally every 48 hours  melatonin 3 mg oral tablet: 1 tab(s) orally once a day (at bedtime) As needed Insomnia  metoprolol succinate 25 mg oral tablet, extended release: 1 tab(s) orally once a day  nitroglycerin 0.4 mg sublingual tablet: 1 tab(s) sublingually prn as needed for  chest pain  polyethylene glycol 3350 oral powder for reconstitution: 17 gram(s) orally once a day As needed Constipation

## 2023-07-14 NOTE — PROVIDER CONTACT NOTE (OTHER) - RECOMMENDATIONS
ACP Melanie Sawyer aware
ACP made aware, will continue to monitor for bleeding/pain
ACP made aware, will continue to monitor for bleeding/pain

## 2023-07-14 NOTE — H&P ADULT - PROBLEM SELECTOR PLAN 6
DVT prophylaxis: hep drip  GI prophylaxis: pepcid 10 mg daily   DNR/DNI  grand-daughter above is the person she designates to make her decisions.   Diet: NPO for now, fu cardiology in AM    Patient would like to leave tomorrow if possible, this will weigh the decision to undergo intervention.

## 2023-07-14 NOTE — PROGRESS NOTE ADULT - PROBLEM SELECTOR PLAN 6
DVT prophylaxis: hep drip  DNR/DNI  grand-daughter above is the person she designates to make her decisions.     Discharge home 7/14. Lives with family members, has aid 5 days/week, has all DME needed at home. Offered PT consult but patient and family felt it was not necessary.

## 2023-07-14 NOTE — PROVIDER CONTACT NOTE (OTHER) - BACKGROUND
Admitting dx: chest pain. Heparin gtt running
pt admitted for chest pain
Admitting dx: chest pain. Heparin gtt running

## 2023-07-14 NOTE — H&P ADULT - PROBLEM SELECTOR PLAN 4
-Cr 1.29, unknown baseline, obtain in AM  -urine studies: urine protein, urine creatinine, urine lytes ordered considering LE edema   -RBUS ordered   -continue to monitor for now

## 2023-07-14 NOTE — DISCHARGE NOTE PROVIDER - NSDCCPCAREPLAN_GEN_ALL_CORE_FT
PRINCIPAL DISCHARGE DIAGNOSIS  Diagnosis: Chest pain  Assessment and Plan of Treatment: You were admitted to the hospital with chest pain most likely due to a heart attack. You were treated with medications to manage the heart attack including a blood thinner infusion for 48hours. You will be discharged on new medications to protect your heart: Aspirin and plavix (blood thinners), atorvasatin (cholesterol lowering), and metoprolol (heart protective). You should stop taking your labetalol. Please follow-up with cardiology and your primary care doctor.     PRINCIPAL DISCHARGE DIAGNOSIS  Diagnosis: Chest pain  Assessment and Plan of Treatment: You were admitted to the hospital with chest pain most likely due to a heart attack. You were treated with medications to manage the heart attack including a blood thinner infusion for 48hours.   You will be discharged on new medications to protect your heart: Aspirin and plavix (blood thinners), atorvasatin (cholesterol lowering), and metoprolol (heart protective). You should stop taking your labetalol. Please follow-up with cardiology and your primary care doctor.  Continue ASA 81 mg daily, Lipitor 80 mg daily, Plavix 75 mg daily  - Continue metoprolol succinate 25 mg daily        PRINCIPAL DISCHARGE DIAGNOSIS  Diagnosis: Chest pain  Assessment and Plan of Treatment: You were admitted to the hospital with chest pain most likely due to a heart attack. You were treated with medications to manage the heart attack including a blood thinner infusion for 48hours.   You will be discharged on new medications to protect your heart: Aspirin and plavix (blood thinners), atorvasatin (cholesterol lowering), and metoprolol (heart protective). You should stop taking your labetalol. Please follow-up with cardiology and your primary care doctor.  Continue ASA 81 mg daily, Lipitor 80 mg daily, Plavix 75 mg daily  - Continue metoprolol succinate 25 mg daily   ****Please make sure to bring the pt back or call 911 if  pt develops chest pain

## 2023-07-14 NOTE — H&P ADULT - NSHPLABSRESULTS_GEN_ALL_CORE
11.5   4.83  )-----------( 147      ( 13 Jul 2023 21:30 )             34.9     142  |  106  |  28<H>  ----------------------------<  142<H>     07-13  3.7   |  21<L>  |  1.29    Ca    9.3      13 Jul 2023 21:30  Mg     1.60     07-13    TPro  6.3  /  Alb  3.8  /  TBili  0.6  /  DBili  x   /  AST  33<H>  /  ALT  16  /  AlkPhos  81  07-13    PT/INR: 13.1/1.13 (07-13-23 @ 21:30)  PTT: 28.4 (07-13-23 @ 21:30)      22:10 - VBG - pH: 7.38  | pCO2: 42    | pO2: 51    | Lactate: 1.8            hs Troponin, T - 182 ng/L (07-13-23 @ 23:50)  hs Troponin, T - 120 ng/L (07-13-23 @ 21:30)      Pro-BNP: 676 (07-13-23 @ 21:30)

## 2023-07-14 NOTE — DISCHARGE NOTE PROVIDER - PROVIDER TOKENS
PROVIDER:[TOKEN:[[id number]]] PROVIDER:[TOKEN:[[id number]]] PROVIDER:[TOKEN:[[id number]]]

## 2023-07-14 NOTE — PATIENT PROFILE ADULT - FALL HARM RISK - HARM RISK INTERVENTIONS
Assistance with ambulation/Assistance OOB with selected safe patient handling equipment/Communicate Risk of Fall with Harm to all staff/Monitor for mental status changes/Monitor gait and stability/Provide patient with walking aids - walker, cane, crutches/Reinforce activity limits and safety measures with patient and family/Reorient to person, place and time as needed/Review medications for side effects contributing to fall risk/Sit up slowly, dangle for a short time, stand at bedside before walking/Tailored Fall Risk Interventions/Toileting schedule using arm’s reach rule for commode and bathroom/Use of alarms - bed, chair and/or voice tab/Visual Cue: Yellow wristband and red socks/Bed in lowest position, wheels locked, appropriate side rails in place/Call bell, personal items and telephone in reach/Instruct patient to call for assistance before getting out of bed or chair/Non-slip footwear when patient is out of bed/[place] to call system/Physically safe environment - no spills, clutter or unnecessary equipment/Purposeful Proactive Rounding/Room/bathroom lighting operational, light cord in reach Assistance with ambulation/Assistance OOB with selected safe patient handling equipment/Communicate Risk of Fall with Harm to all staff/Monitor for mental status changes/Monitor gait and stability/Provide patient with walking aids - walker, cane, crutches/Reinforce activity limits and safety measures with patient and family/Reorient to person, place and time as needed/Review medications for side effects contributing to fall risk/Sit up slowly, dangle for a short time, stand at bedside before walking/Tailored Fall Risk Interventions/Toileting schedule using arm’s reach rule for commode and bathroom/Use of alarms - bed, chair and/or voice tab/Visual Cue: Yellow wristband and red socks/Bed in lowest position, wheels locked, appropriate side rails in place/Call bell, personal items and telephone in reach/Instruct patient to call for assistance before getting out of bed or chair/Non-slip footwear when patient is out of bed/[place] to call system/Physically safe environment - no spills, clutter or unnecessary equipment/Purposeful Proactive Rounding/Room/bathroom lighting operational, light cord in reach Assistance with ambulation/Assistance OOB with selected safe patient handling equipment/Communicate Risk of Fall with Harm to all staff/Monitor for mental status changes/Monitor gait and stability/Provide patient with walking aids - walker, cane, crutches/Reinforce activity limits and safety measures with patient and family/Reorient to person, place and time as needed/Review medications for side effects contributing to fall risk/Sit up slowly, dangle for a short time, stand at bedside before walking/Tailored Fall Risk Interventions/Toileting schedule using arm’s reach rule for commode and bathroom/Use of alarms - bed, chair and/or voice tab/Visual Cue: Yellow wristband and red socks/Bed in lowest position, wheels locked, appropriate side rails in place/Call bell, personal items and telephone in reach/Instruct patient to call for assistance before getting out of bed or chair/Non-slip footwear when patient is out of bed/[place] to call system/Physically safe environment - no spills, clutter or unnecessary equipment/Purposeful Proactive Rounding/Room/bathroom lighting operational, light cord in reach

## 2023-07-14 NOTE — ED ADULT NURSE REASSESSMENT NOTE - NS ED NURSE REASSESS COMMENT FT1
Pt A&Ox4, breathing even and unlabored. NSR on the cardiac monitor. Pt started on heparin drip per ACS nomogram. Pt educated on indication as well as adverse reactions to report. Pt's family member at bedside to translate. 20G IV placed on right arm. Repeat aptt and cbc to be drawn at 7:26AM.

## 2023-07-14 NOTE — ED ADULT NURSE REASSESSMENT NOTE - NS ED NURSE REASSESS COMMENT FT1
Break RN: Pt is A&Ox4, resting in stretcher with no complaints at this time. Respirations even and unlabored, chest rise equal b/l. NSR noted on cardiac monitor. VS as noted in flow sheets. Pt denies chest pain, SOB, fever, cough, chills, abdominal pain, N/V/D, h/a, dizziness, numbness/tingling or any urinary symptoms at this time. No acute distress noted. Repeat EKG and troponin collected and sent. Safety maintained throughout. Will continue to monitor.

## 2023-07-14 NOTE — H&P ADULT - NSHPPHYSICALEXAM_GEN_ALL_CORE
Vital Signs Last 24 Hrs  T(C): 36.7 (13 Jul 2023 23:50), Max: 36.8 (13 Jul 2023 19:44)  T(F): 98.1 (13 Jul 2023 23:50), Max: 98.3 (13 Jul 2023 19:44)  HR: 78 (13 Jul 2023 23:50) (75 - 81)  BP: 153/63 (13 Jul 2023 23:50) (128/57 - 153/63)  BP(mean): --  RR: 16 (13 Jul 2023 23:50) (16 - 18)  SpO2: 100% (13 Jul 2023 23:50) (97% - 100%)    Parameters below as of 13 Jul 2023 23:50  Patient On (Oxygen Delivery Method): room air        CONSTITUTIONAL: Well-groomed, in no apparent distress  EYES: No conjunctival or scleral injection, non-icteric; PERRLA and symmetric  ENMT: No external nasal lesions; nasal mucosa not inflamed  NECK: Trachea midline without palpable neck mass; thyroid not enlarged and non-tender  RESPIRATORY: Breathing comfortably; no dullness to percussion; lungs CTA without wheeze/rhonchi/rales  CARDIOVASCULAR: +S1S2, RRR, no M/G/R; no carotid bruits; pedal pulses full and symmetric; 1+ pitting lower extremity edema  GASTROINTESTINAL: No palpable masses or tenderness, +BS throughout, no rebound/guarding; no hepatosplenomegaly; no hernia palpated  LYMPHATIC: No cervical LAD or tenderness; no axillary LAD or tenderness; no inguinal LAD or tenderness  MUSCULOSKELETAL: no digital clubbing or cyanosis; no paraspinal tenderness; examination of the  (head/neck, spine/ribs/pelvis, RUE, LUE, RLE, LLE) without misalignment, normal strength and tone of extremities  SKIN: No rashes or ulcers noted; no subcutaneous nodules or induration palpable  NEUROLOGIC: CN II-XII intact; normal reflexes in upper and lower extremities; sensation intact in LEs b/l to light touch  PSYCHIATRIC: A+O x 3; mood and affect appropriate; appropriate insight and judgment

## 2023-07-15 VITALS — WEIGHT: 140.21 LBS | HEIGHT: 59 IN

## 2023-07-15 LAB
ANION GAP SERPL CALC-SCNC: 14 MMOL/L — SIGNIFICANT CHANGE UP (ref 7–14)
APTT BLD: 196.4 SEC — CRITICAL HIGH (ref 27–36.3)
APTT BLD: 35.5 SEC — SIGNIFICANT CHANGE UP (ref 27–36.3)
BUN SERPL-MCNC: 27 MG/DL — HIGH (ref 7–23)
CALCIUM SERPL-MCNC: 9.2 MG/DL — SIGNIFICANT CHANGE UP (ref 8.4–10.5)
CHLORIDE SERPL-SCNC: 108 MMOL/L — HIGH (ref 98–107)
CK MB BLD-MCNC: 4.1 % — HIGH (ref 0–2.5)
CK MB CFR SERPL CALC: 4.9 NG/ML — HIGH
CK SERPL-CCNC: 120 U/L — SIGNIFICANT CHANGE UP (ref 25–170)
CO2 SERPL-SCNC: 20 MMOL/L — LOW (ref 22–31)
CREAT SERPL-MCNC: 1.27 MG/DL — SIGNIFICANT CHANGE UP (ref 0.5–1.3)
EGFR: 38 ML/MIN/1.73M2 — LOW
GLUCOSE SERPL-MCNC: 93 MG/DL — SIGNIFICANT CHANGE UP (ref 70–99)
HCT VFR BLD CALC: 34.6 % — SIGNIFICANT CHANGE UP (ref 34.5–45)
HGB BLD-MCNC: 11.5 G/DL — SIGNIFICANT CHANGE UP (ref 11.5–15.5)
MAGNESIUM SERPL-MCNC: 1.6 MG/DL — SIGNIFICANT CHANGE UP (ref 1.6–2.6)
MCHC RBC-ENTMCNC: 29.4 PG — SIGNIFICANT CHANGE UP (ref 27–34)
MCHC RBC-ENTMCNC: 33.2 GM/DL — SIGNIFICANT CHANGE UP (ref 32–36)
MCV RBC AUTO: 88.5 FL — SIGNIFICANT CHANGE UP (ref 80–100)
NRBC # BLD: 0 /100 WBCS — SIGNIFICANT CHANGE UP (ref 0–0)
NRBC # FLD: 0 K/UL — SIGNIFICANT CHANGE UP (ref 0–0)
PHOSPHATE SERPL-MCNC: 2.7 MG/DL — SIGNIFICANT CHANGE UP (ref 2.5–4.5)
PLATELET # BLD AUTO: 143 K/UL — LOW (ref 150–400)
POTASSIUM SERPL-MCNC: 4.2 MMOL/L — SIGNIFICANT CHANGE UP (ref 3.5–5.3)
POTASSIUM SERPL-SCNC: 4.2 MMOL/L — SIGNIFICANT CHANGE UP (ref 3.5–5.3)
RBC # BLD: 3.91 M/UL — SIGNIFICANT CHANGE UP (ref 3.8–5.2)
RBC # FLD: 13.3 % — SIGNIFICANT CHANGE UP (ref 10.3–14.5)
SODIUM SERPL-SCNC: 142 MMOL/L — SIGNIFICANT CHANGE UP (ref 135–145)
WBC # BLD: 5.43 K/UL — SIGNIFICANT CHANGE UP (ref 3.8–10.5)
WBC # FLD AUTO: 5.43 K/UL — SIGNIFICANT CHANGE UP (ref 3.8–10.5)

## 2023-07-15 PROCEDURE — 99238 HOSP IP/OBS DSCHRG MGMT 30/<: CPT

## 2023-07-15 PROCEDURE — 99232 SBSQ HOSP IP/OBS MODERATE 35: CPT | Mod: GC

## 2023-07-15 RX ORDER — POLYETHYLENE GLYCOL 3350 17 G/17G
17 POWDER, FOR SOLUTION ORAL DAILY
Refills: 0 | Status: DISCONTINUED | OUTPATIENT
Start: 2023-07-15 | End: 2023-07-15

## 2023-07-15 RX ORDER — LANOLIN ALCOHOL/MO/W.PET/CERES
1 CREAM (GRAM) TOPICAL
Qty: 0 | Refills: 0 | DISCHARGE
Start: 2023-07-15

## 2023-07-15 RX ORDER — METOPROLOL TARTRATE 50 MG
1 TABLET ORAL
Qty: 30 | Refills: 0
Start: 2023-07-15 | End: 2023-08-13

## 2023-07-15 RX ORDER — FAMOTIDINE 10 MG/ML
1 INJECTION INTRAVENOUS
Qty: 15 | Refills: 0
Start: 2023-07-15 | End: 2023-08-13

## 2023-07-15 RX ORDER — POLYETHYLENE GLYCOL 3350 17 G/17G
17 POWDER, FOR SOLUTION ORAL
Qty: 0 | Refills: 0 | DISCHARGE
Start: 2023-07-15

## 2023-07-15 RX ORDER — ASPIRIN/CALCIUM CARB/MAGNESIUM 324 MG
1 TABLET ORAL
Qty: 30 | Refills: 0
Start: 2023-07-15 | End: 2023-08-13

## 2023-07-15 RX ORDER — ACETAMINOPHEN 500 MG
2 TABLET ORAL
Qty: 0 | Refills: 0 | DISCHARGE
Start: 2023-07-15

## 2023-07-15 RX ORDER — NITROGLYCERIN 6.5 MG
1 CAPSULE, EXTENDED RELEASE ORAL
Qty: 1 | Refills: 0
Start: 2023-07-15 | End: 2023-07-24

## 2023-07-15 RX ORDER — ATORVASTATIN CALCIUM 80 MG/1
1 TABLET, FILM COATED ORAL
Qty: 30 | Refills: 0
Start: 2023-07-15 | End: 2023-08-13

## 2023-07-15 RX ORDER — CLOPIDOGREL BISULFATE 75 MG/1
1 TABLET, FILM COATED ORAL
Qty: 30 | Refills: 0
Start: 2023-07-15 | End: 2023-08-13

## 2023-07-15 RX ADMIN — Medication 81 MILLIGRAM(S): at 12:54

## 2023-07-15 RX ADMIN — HEPARIN SODIUM 550 UNIT(S)/HR: 5000 INJECTION INTRAVENOUS; SUBCUTANEOUS at 02:27

## 2023-07-15 RX ADMIN — Medication 25 MILLIGRAM(S): at 05:16

## 2023-07-15 RX ADMIN — HEPARIN SODIUM 350 UNIT(S)/HR: 5000 INJECTION INTRAVENOUS; SUBCUTANEOUS at 12:54

## 2023-07-15 RX ADMIN — HEPARIN SODIUM 550 UNIT(S)/HR: 5000 INJECTION INTRAVENOUS; SUBCUTANEOUS at 08:22

## 2023-07-15 RX ADMIN — Medication 650 MILLIGRAM(S): at 09:50

## 2023-07-15 RX ADMIN — HEPARIN SODIUM 0 UNIT(S)/HR: 5000 INJECTION INTRAVENOUS; SUBCUTANEOUS at 11:43

## 2023-07-15 RX ADMIN — HEPARIN SODIUM 3800 UNIT(S): 5000 INJECTION INTRAVENOUS; SUBCUTANEOUS at 02:27

## 2023-07-15 RX ADMIN — CLOPIDOGREL BISULFATE 75 MILLIGRAM(S): 75 TABLET, FILM COATED ORAL at 12:54

## 2023-07-15 NOTE — PROGRESS NOTE ADULT - ASSESSMENT
89 year old Amharic speaking female with PMHx of HTN HLD, known LBBB who presents for chest pain <24 hrs. Based on pt symptomatology and elevation in cardiac enzymes, pt with NSTEMI, likely type 1 iso remainder of pertinent labs being wnl. Pt's granddaughter at bedside for [facility] discussion regarding ACS protocol. Pt expressed concern for receiving heparin given the high risk for bleeding and also expresses desire to not be admitted to the hospital for a long period of time. After lengthy discussion, pt is amenable is amenable to medical management.     CARDIOVASCULAR  #NSTEMI  - KATY score 140  - HAS BLED score 4  - EKG significant for NSR with 1st degree AV block and LBBB without acute ST-T deviation  - CKMB has downtrended  - Patient is chest pain free  - Pt HD stable and does not appear volume overloaded  - S/p  mg POx1, Plavix 600 mg x1, ACS heparin bolus  - Continue heparin gtt x 48hrs, follow up PTT and adjust dosing for therapeutic range  - Continue ASA 81 mg daily, Lipitor 80 mg daily, Plavix 75 mg daily  - Continue metoprolol succinate 25 mg daily   - After 48h of AC with IV heparin, patient can be discharged on above medical regimen    Akil Olivera MD  Department of Cardiology  Cardiology Fellow, PGY6   89 year old Chinese speaking female with PMHx of HTN HLD, known LBBB who presents for chest pain <24 hrs. Based on pt symptomatology and elevation in cardiac enzymes, pt with NSTEMI, likely type 1 iso remainder of pertinent labs being wnl. Pt's granddaughter at bedside for [facility] discussion regarding ACS protocol. Pt expressed concern for receiving heparin given the high risk for bleeding and also expresses desire to not be admitted to the hospital for a long period of time. After lengthy discussion, pt is amenable is amenable to medical management.     CARDIOVASCULAR  #NSTEMI  - KATY score 140  - HAS BLED score 4  - EKG significant for NSR with 1st degree AV block and LBBB without acute ST-T deviation  - CKMB has downtrended  - Patient is chest pain free  - Pt HD stable and does not appear volume overloaded  - S/p  mg POx1, Plavix 600 mg x1, ACS heparin bolus  - Continue heparin gtt x 48hrs, follow up PTT and adjust dosing for therapeutic range  - Continue ASA 81 mg daily, Lipitor 80 mg daily, Plavix 75 mg daily  - Continue metoprolol succinate 25 mg daily   - After 48h of AC with IV heparin, patient can be discharged on above medical regimen    Akil Olivera MD  Department of Cardiology  Cardiology Fellow, PGY6   89 year old Greek speaking female with PMHx of HTN HLD, known LBBB who presents for chest pain <24 hrs. Based on pt symptomatology and elevation in cardiac enzymes, pt with NSTEMI, likely type 1 iso remainder of pertinent labs being wnl. Pt's granddaughter at bedside for [facility] discussion regarding ACS protocol. Pt expressed concern for receiving heparin given the high risk for bleeding and also expresses desire to not be admitted to the hospital for a long period of time. After lengthy discussion, pt is amenable is amenable to medical management.     CARDIOVASCULAR  #NSTEMI  - KATY score 140  - HAS BLED score 4  - EKG significant for NSR with 1st degree AV block and LBBB without acute ST-T deviation  - CKMB has downtrended  - Patient is chest pain free  - Pt HD stable and does not appear volume overloaded  - S/p  mg POx1, Plavix 600 mg x1, ACS heparin bolus  - Continue heparin gtt x 48hrs, follow up PTT and adjust dosing for therapeutic range  - Continue ASA 81 mg daily, Lipitor 80 mg daily, Plavix 75 mg daily  - Continue metoprolol succinate 25 mg daily   - After 48h of AC with IV heparin, patient can be discharged on above medical regimen    Akil Olivera MD  Department of Cardiology  Cardiology Fellow, PGY6

## 2023-07-15 NOTE — PROGRESS NOTE ADULT - ASSESSMENT
Ms. Petersen is a 89 year old F with history of HTN and HLD who presents with exertional mid-sternal chest pain with radiation to right shoulder starting yesterday afternoon. Troponin rising and pro- concerning for NSTEMI type 1.     Seen by cardiology 7/15  "CARDIOVASCULAR  #NSTEMI  - KATY score 140  - HAS BLED score 4  - EKG significant for NSR with 1st degree AV block and LBBB without acute ST-T deviation  - CKMB has downtrended  - Patient is chest pain free  - Pt HD stable and does not appear volume overloaded  - S/p  mg POx1, Plavix 600 mg x1, ACS heparin bolus  - Continue heparin gtt x 48hrs, follow up PTT and adjust dosing for therapeutic range  - Continue ASA 81 mg daily, Lipitor 80 mg daily, Plavix 75 mg daily  - Continue metoprolol succinate 25 mg daily   - After 48h of AC with IV heparin, patient can be discharged on above medical regimen"

## 2023-07-15 NOTE — PROGRESS NOTE ADULT - PROBLEM SELECTOR PLAN 4
-Cr 1.29, unknown baseline, stable on 7/14, renal ultrasound unremarkable
-Cr 1.29, unknown baseline, obtain in AM  -urine studies: urine protein, urine creatinine, urine lytes ordered considering LE edema   -RBUS ordered   -continue to monitor for now

## 2023-07-15 NOTE — DISCHARGE NOTE NURSING/CASE MANAGEMENT/SOCIAL WORK - PATIENT PORTAL LINK FT
You can access the FollowMyHealth Patient Portal offered by [facility] by registering at the following website: [email or web address]. By joining [employer]’s FollowMyHealth portal, you will also be able to view your health information using other applications (apps) compatible with our system. You can access the FollowMyHealth Patient Portal offered by [facility] by registering at the following website: [email or web address]. By joining [employer]’s FollowMyHealth portal, you will also be able to view your health information using other applications (apps) compatible with our system. You can access the FollowMyHealth Patient Portal offered by [facility] by registering at the following website: [email or web address]. By joining [employer]’s FollowMyHealth portal, you will also be able to view your health information using other applications (apps) compatible with our system.

## 2023-07-15 NOTE — PROGRESS NOTE ADULT - PROBLEM SELECTOR PLAN 5
goals of care discussed, patient would like to be DNR/DNI, no antibiotics, dialysis or feeding tube.
GOC discussed on 7/14, DNR/DNI, no invasive procedures, only bring to hospital if uncontrolled symptoms -- MOLST completed with patient and granddaughter

## 2023-07-15 NOTE — PROVIDER CONTACT NOTE (CRITICAL VALUE NOTIFICATION) - ASSESSMENT
pt shows no active signs of bleeding
On assessment, pt. is asymptomatic. Denies SOB and/or chest pain at this time. No s/s noted at this time.

## 2023-07-15 NOTE — PROGRESS NOTE ADULT - SUBJECTIVE AND OBJECTIVE BOX
Patient is a 89y old  Female who presents with a chief complaint of NSTEMI (15 Jul 2023 09:24)      SUBJECTIVE / OVERNIGHT EVENTS:    MEDICATIONS  (STANDING):  aspirin  chewable 81 milliGRAM(s) Oral daily  atorvastatin 80 milliGRAM(s) Oral at bedtime  clopidogrel Tablet 75 milliGRAM(s) Oral daily  famotidine    Tablet 10 milliGRAM(s) Oral every 48 hours  heparin  Infusion.  Unit(s)/Hr (7.5 mL/Hr) IV Continuous <Continuous>  metoprolol succinate ER 25 milliGRAM(s) Oral daily    MEDICATIONS  (PRN):  acetaminophen     Tablet .. 650 milliGRAM(s) Oral every 6 hours PRN Mild Pain (1 - 3)  heparin   Injectable 3800 Unit(s) IV Push every 6 hours PRN For aPTT less than 40  melatonin 3 milliGRAM(s) Oral at bedtime PRN Insomnia  polyethylene glycol 3350 17 Gram(s) Oral daily PRN Constipation    I&O's Summary    14 Jul 2023 07:01  -  15 Jul 2023 07:00  --------------------------------------------------------  IN: 1220 mL / OUT: 0 mL / NET: 1220 mL    15 Jul 2023 07:01  -  15 Jul 2023 12:32  --------------------------------------------------------  IN: 120 mL / OUT: 0 mL / NET: 120 mL      Vital Signs Last 24 Hrs  T(C): 36.6 (15 Jul 2023 05:09), Max: 36.6 (14 Jul 2023 15:36)  T(F): 97.9 (15 Jul 2023 05:09), Max: 97.9 (14 Jul 2023 15:36)  HR: 95 (15 Jul 2023 05:09) (72 - 95)  BP: 101/64 (15 Jul 2023 05:09) (101/64 - 130/75)  RR: 17 (15 Jul 2023 05:09) (17 - 18)  SpO2: 95% (15 Jul 2023 05:09) (95% - 100%)  Patient On (Oxygen Delivery Method): room air      PHYSICAL EXAM:  GENERAL: NAD, well-developed  HEAD:  Atraumatic, Normocephalic  EYES: EOMI, PERRLA, conjunctiva and sclera clear  NECK: Supple, No JVD  CHEST/LUNG: Clear to auscultation bilaterally; No wheeze  HEART: Regular rate and rhythm; No murmurs, rubs, or gallops  ABDOMEN: Soft, Nontender, Nondistended; Bowel sounds present  EXTREMITIES:  2+ Peripheral Pulses, No clubbing, cyanosis, or edema  PSYCH: AAOx3  NEUROLOGY: non-focal  SKIN: No rashes or lesions    LABS:                        11.5   5.43  )-----------( 143      ( 15 Jul 2023 05:09 )             34.6     07-15    142  |  108<H>  |  27<H>  ----------------------------<  93  4.2   |  20<L>  |  1.27    Ca    9.2      15 Jul 2023 05:09  Phos  2.7     07-15  Mg     1.60     07-15    TPro  6.4  /  Alb  3.7  /  TBili  1.0  /  DBili  x   /  AST  32  /  ALT  17  /  AlkPhos  64  07-14    PT/INR - ( 14 Jul 2023 04:40 )   PT: 15.0 sec;   INR: 1.29 ratio         PTT - ( 15 Jul 2023 09:38 )  PTT:196.4 sec  CARDIAC MARKERS ( 15 Jul 2023 05:09 )  x     / x     / 120 U/L / x     / 4.9 ng/mL  CARDIAC MARKERS ( 14 Jul 2023 04:40 )  x     / x     / 194 U/L / x     / 14.2 ng/mL  CARDIAC MARKERS ( 13 Jul 2023 23:50 )  x     / x     / 197 U/L / x     / 11.0 ng/mL        Consultant(s) Notes Reviewed:      Seen by cardiology 7/15  "CARDIOVASCULAR  #NSTEMI  - KATY score 140  - HAS BLED score 4  - EKG significant for NSR with 1st degree AV block and LBBB without acute ST-T deviation  - CKMB has downtrended  - Patient is chest pain free  - Pt HD stable and does not appear volume overloaded  - S/p  mg POx1, Plavix 600 mg x1, ACS heparin bolus  - Continue heparin gtt x 48hrs, follow up PTT and adjust dosing for therapeutic range  - Continue ASA 81 mg daily, Lipitor 80 mg daily, Plavix 75 mg daily  - Continue metoprolol succinate 25 mg daily   - After 48h of AC with IV heparin, patient can be discharged on above medical regimen"     Patient is a 89y old  Female who presents with a chief complaint of NSTEMI (15 Jul 2023 09:24)      SUBJECTIVE / OVERNIGHT EVENTS:  Patient seen with grand-daughter Francine at bedside  Explained that Both Cardiology and prior MD rec heparin x 48 hrs then home on Sunday.  D/w [facility] already -re-instated  lesia d/c 7/16 early    MEDICATIONS  (STANDING):  aspirin  chewable 81 milliGRAM(s) Oral daily  atorvastatin 80 milliGRAM(s) Oral at bedtime  clopidogrel Tablet 75 milliGRAM(s) Oral daily  famotidine    Tablet 10 milliGRAM(s) Oral every 48 hours  heparin  Infusion.  Unit(s)/Hr (7.5 mL/Hr) IV Continuous <Continuous>  metoprolol succinate ER 25 milliGRAM(s) Oral daily    MEDICATIONS  (PRN):  acetaminophen     Tablet .. 650 milliGRAM(s) Oral every 6 hours PRN Mild Pain (1 - 3)  heparin   Injectable 3800 Unit(s) IV Push every 6 hours PRN For aPTT less than 40  melatonin 3 milliGRAM(s) Oral at bedtime PRN Insomnia  polyethylene glycol 3350 17 Gram(s) Oral daily PRN Constipation    I&O's Summary    14 Jul 2023 07:01  -  15 Jul 2023 07:00  --------------------------------------------------------  IN: 1220 mL / OUT: 0 mL / NET: 1220 mL    15 Jul 2023 07:01  -  15 Jul 2023 12:32  --------------------------------------------------------  IN: 120 mL / OUT: 0 mL / NET: 120 mL      Vital Signs Last 24 Hrs  T(C): 36.6 (15 Jul 2023 05:09), Max: 36.6 (14 Jul 2023 15:36)  T(F): 97.9 (15 Jul 2023 05:09), Max: 97.9 (14 Jul 2023 15:36)  HR: 95 (15 Jul 2023 05:09) (72 - 95)  BP: 101/64 (15 Jul 2023 05:09) (101/64 - 130/75)  RR: 17 (15 Jul 2023 05:09) (17 - 18)  SpO2: 95% (15 Jul 2023 05:09) (95% - 100%)  Patient On (Oxygen Delivery Method): room air      PHYSICAL EXAM:  Very pleasant lady- no mor chest pain  GENERAL: NAD, well-developed  HEAD:  Atraumatic, Normocephalic  EYES: EOMI, PERRLA, conjunctiva and sclera clear  NECK: Supple, No JVD  CHEST/LUNG: Clear to auscultation bilaterally; No wheeze  HEART: Regular rate and rhythm; No murmurs, rubs, or gallops  ABDOMEN: Soft, Nontender, Nondistended; Bowel sounds present  EXTREMITIES:  2+ Peripheral Pulses, No clubbing, cyanosis, or edema  PSYCH: Alert  NEUROLOGY: non-focal      LABS:                        11.5   5.43  )-----------( 143      ( 15 Jul 2023 05:09 )             34.6     07-15    142  |  108<H>  |  27<H>  ----------------------------<  93  4.2   |  20<L>  |  1.27    Ca    9.2      15 Jul 2023 05:09  Phos  2.7     07-15  Mg     1.60     07-15    TPro  6.4  /  Alb  3.7  /  TBili  1.0  /  DBili  x   /  AST  32  /  ALT  17  /  AlkPhos  64  07-14    PT/INR - ( 14 Jul 2023 04:40 )   PT: 15.0 sec;   INR: 1.29 ratio         PTT - ( 15 Jul 2023 09:38 )  PTT:196.4 sec  CARDIAC MARKERS ( 15 Jul 2023 05:09 )  x     / x     / 120 U/L / x     / 4.9 ng/mL  CARDIAC MARKERS ( 14 Jul 2023 04:40 )  x     / x     / 194 U/L / x     / 14.2 ng/mL  CARDIAC MARKERS ( 13 Jul 2023 23:50 )  x     / x     / 197 U/L / x     / 11.0 ng/mL        Consultant(s) Notes Reviewed:      Seen by cardiology 7/15  "CARDIOVASCULAR  #NSTEMI  - KATY score 140  - HAS BLED score 4  - EKG significant for NSR with 1st degree AV block and LBBB without acute ST-T deviation  - CKMB has downtrended  - Patient is chest pain free  - Pt HD stable and does not appear volume overloaded  - S/p  mg POx1, Plavix 600 mg x1, ACS heparin bolus  - Continue heparin gtt x 48hrs, follow up PTT and adjust dosing for therapeutic range  - Continue ASA 81 mg daily, Lipitor 80 mg daily, Plavix 75 mg daily  - Continue metoprolol succinate 25 mg daily   - After 48h of AC with IV heparin, patient can be discharged on above medical regimen"

## 2023-07-15 NOTE — PROGRESS NOTE ADULT - ATTENDING COMMENTS
Plan as outlined by fellow above.  No plans for cardiac cath,  Continue DAPT indefinitely if no significant bleeding.

## 2023-07-15 NOTE — PROGRESS NOTE ADULT - PROBLEM SELECTOR PLAN 2
-continue fosinopril 40 mg daily,  HCTZ 25 mg daily, labetalol 100 mg BID  -continue to monitor
-holding home fosinopril, HCTZ, labetalol  -resume HCTZ and fosinopril depending on BPs  -plan to switch labetalol to metoprolol on d/c given c/f CAD

## 2023-07-15 NOTE — PROGRESS NOTE ADULT - PROBLEM SELECTOR PLAN 6
DVT prophylaxis: hep drip  GI prophylaxis: pepcid 10 mg daily   DNR/DNI  grand-daughter above is the person she designates to make her decisions.   Diet: NPO for now, fu cardiology in AM    Prior MD s/o notes   Plan for d/c home 7/16 after heparin drip x 2 days , heparin drip was started 7/14 DVT prophylaxis: hep drip  GI prophylaxis: pepcid 10 mg daily   DNR/DNI  grand-daughter above is the person she designates to make her decisions.   Diet: NPO for now, fu cardiology in AM    Prior MD s/o notes   Plan for d/c home 7/16 after heparin drip x 2 days , heparin drip was started 7/14  d/w patient and gran-daughter,plan for home 7/16  keep PTT 60 to 80

## 2023-07-15 NOTE — PROGRESS NOTE ADULT - PROBLEM SELECTOR PLAN 1
Type 1 MI with mid-sternal exertional chest pain radiating to R arm, elevated tropoinin, EKG with LBBB. ECHO w/ EF 56% and no RWMA Per multiple discussions with patient and her family would like to avoid invasive procedures and so managing medically. Initially did not want heparin gtt (did not realize was only recommended for 48 hours, was thinking this would be long-term) - now interested in all medical mgmt for MI including heparin gtt.  - Cardiology consulted  - Troponin still rising, continue to trend to peak  -loaded ASA and plavix, continue with 81 mg ASA and plavix 75 mg daily  -atorvastatin 80  -start metoprolol succinate 25 mg daily with hold parameters  -serial EKG for changes in pain  -continue cardiac telemetry  -Heparin gtt x48 hours  -No plan for LHC given GOC to avoid any invasive procedure - d/w patient and granddaughter on 7/14  -Consider NTG SL PRN chest pain on discharge - family requesting as a way to keep her from coming to hospital if future chest pain
-likely type 1  -troponin 120->182, continue to trend troponin to peak with CK and CKMB  -loaded ASA and plavix, continue with 81 mg ASA and plavix 75 mg daily  -serial EKG for changes in pain  -EKG with NSR, 1st degree AV block, LBBB  -continue cardiac telemetry  -TTE ordered for tomorrow  -c/w heparin drip  -with cardiology note as above

## 2023-07-15 NOTE — PROVIDER CONTACT NOTE (CRITICAL VALUE NOTIFICATION) - ACTION/TREATMENT ORDERED:
As per heparin nomogram- heparin stopped for 1 hr.
notified ACP. follow nomogram. Stop heparin drip for 1 hour, restart at 1244 and decrease by 2ml/hr.

## 2023-07-15 NOTE — DISCHARGE NOTE NURSING/CASE MANAGEMENT/SOCIAL WORK - NSDCPEFALRISK_GEN_ALL_CORE
For information on Fall & Injury Prevention, visit: [email or web address]-[email or web address]-mobility OR  [email or web address]/[email or web address]-injury OR  https://www.cdc.gov/steadi/patient.html For information on Fall & Injury Prevention, visit: [email or web address]://[email or web address].[email or web address]/[email or web address]-[email or web address]-[email or web address]-mobility OR  https://[email or web address].[email or web address]/[email or web address]-[email or web address]-injury OR  https://www.cdc.gov/steadi/patient.html For information on Fall & Injury Prevention, visit: [email or web address]-[email or web address]-[email or web address]-mobility OR  [email or web address]-injury OR  https://www.cdc.gov/steadi/patient.html

## 2023-07-15 NOTE — PROGRESS NOTE ADULT - SUBJECTIVE AND OBJECTIVE BOX
Cardiology Progress Note    Patient seen and examined at bedside.    Overnight Events:   - Patient opted for DNR/DNI, plan to treat NSTEMI medically and avoid invasive medical procedures  - HS troponin 120 -> 182 -> 269   - CKMB 11 -> 14.2 -> 4.9  - Tele: SR 70s, no events  - PTT subtherapeutic at 1am    Review Of Systems: No chest pain, shortness of breath, or palpitations            Current Meds:  acetaminophen     Tablet .. 650 milliGRAM(s) Oral every 6 hours PRN  aspirin  chewable 81 milliGRAM(s) Oral daily  atorvastatin 80 milliGRAM(s) Oral at bedtime  clopidogrel Tablet 75 milliGRAM(s) Oral daily  famotidine    Tablet 10 milliGRAM(s) Oral every 48 hours  heparin   Injectable 3800 Unit(s) IV Push every 6 hours PRN  heparin  Infusion.  Unit(s)/Hr IV Continuous <Continuous>  melatonin 3 milliGRAM(s) Oral at bedtime PRN  metoprolol succinate ER 25 milliGRAM(s) Oral daily  polyethylene glycol 3350 17 Gram(s) Oral daily PRN    Vitals:  T(F): 97.9 (07-15), Max: 97.9 (07-14)  HR: 95 (07-15) (72 - 95)  BP: 101/64 (07-15) (101/64 - 136/68)  RR: 17 (07-15)  SpO2: 95% (07-15)  I&O's Summary    14 Jul 2023 07:01  -  15 Jul 2023 07:00  --------------------------------------------------------  IN: 1220 mL / OUT: 0 mL / NET: 1220 mL    Physical Exam:  Appearance: Appears younger than stated age	  HEENT: Normal oral mucosa	  Cardiovascular: Normal S1 S2, No JVD, No murmurs, No edema  Respiratory: Lungs clear to auscultation	  Psychiatry: A & O x 3, Mood & affect appropriate  Gastrointestinal:  Soft, Non-tender, + BS	  Skin: No rashes, No ecchymoses, No cyanosis	  Neurologic: Non-focal  Extremities: Warm and well perfused. 2+ pulses bilaterally                          11.5   5.43  )-----------( 143      ( 15 Jul 2023 05:09 )             34.6     07-15    142  |  108<H>  |  27<H>  ----------------------------<  93  4.2   |  20<L>  |  1.27    Ca    9.2      15 Jul 2023 05:09  Phos  2.7     07-15  Mg     1.60     07-15    TPro  6.4  /  Alb  3.7  /  TBili  1.0  /  DBili  x   /  AST  32  /  ALT  17  /  AlkPhos  64  07-14    PT/INR - ( 14 Jul 2023 04:40 )   PT: 15.0 sec;   INR: 1.29 ratio      Activated Partial Thromboplastin Time: 35.5     PTT - ( 15 Jul 2023 01:10 )  PTT:35.5 sec  CARDIAC MARKERS ( 15 Jul 2023 05:09 )  x     / x     / x     / 120 U/L / x     / 4.9 ng/mL  CARDIAC MARKERS ( 14 Jul 2023 04:40 )  269 ng/L / x     / x     / 194 U/L / x     / 14.2 ng/mL  CARDIAC MARKERS ( 13 Jul 2023 23:50 )  182 ng/L / x     / x     / 197 U/L / x     / 11.0 ng/mL  CARDIAC MARKERS ( 13 Jul 2023 21:30 )  120 ng/L / x     / x     / x     / x     / x      TTE 7/14  CONCLUSIONS:  1. Mitral annular calcification, otherwise normal mitral  valve. Mild mitral regurgitation.  2. Aortic valve leaflet morphology not well visualized.  Mild aortic regurgitation.  3. Endocardium not well visualized; grossly low normal left  ventricular systolic function.  Estimated LVEF about  56%  (by Gómez's method).  Endocardial visualization enhanced  with intravenous injection of echo contrast (Definity).  4. The right ventricle is not well visualized; grossly  normal right ventricular systolic function.

## 2023-10-04 ENCOUNTER — EMERGENCY (EMERGENCY)
Facility: HOSPITAL | Age: 88
LOS: 1 days | Discharge: HOME | End: 2023-10-04
Attending: EMERGENCY MEDICINE | Admitting: STUDENT IN AN ORGANIZED HEALTH CARE EDUCATION/TRAINING PROGRAM
Payer: MEDICARE

## 2023-10-04 VITALS
DIASTOLIC BLOOD PRESSURE: 68 MMHG | SYSTOLIC BLOOD PRESSURE: 128 MMHG | HEART RATE: 72 BPM | OXYGEN SATURATION: 99 % | TEMPERATURE: 98 F | RESPIRATION RATE: 22 BRPM

## 2023-10-04 VITALS
HEART RATE: 82 BPM | DIASTOLIC BLOOD PRESSURE: 54 MMHG | TEMPERATURE: 98 F | OXYGEN SATURATION: 99 % | SYSTOLIC BLOOD PRESSURE: 142 MMHG | RESPIRATION RATE: 16 BRPM

## 2023-10-04 LAB
ALBUMIN SERPL ELPH-MCNC: 3.5 G/DL — SIGNIFICANT CHANGE UP (ref 3.3–5)
ALP SERPL-CCNC: 68 U/L — SIGNIFICANT CHANGE UP (ref 40–120)
ALT FLD-CCNC: 45 U/L — HIGH (ref 4–33)
ANION GAP SERPL CALC-SCNC: 11 MMOL/L — SIGNIFICANT CHANGE UP (ref 7–14)
AST SERPL-CCNC: 57 U/L — HIGH (ref 4–32)
BASOPHILS # BLD AUTO: 0.05 K/UL — SIGNIFICANT CHANGE UP (ref 0–0.2)
BASOPHILS NFR BLD AUTO: 0.7 % — SIGNIFICANT CHANGE UP (ref 0–2)
BILIRUB SERPL-MCNC: 1.2 MG/DL — SIGNIFICANT CHANGE UP (ref 0.2–1.2)
BUN SERPL-MCNC: 40 MG/DL — HIGH (ref 7–23)
CALCIUM SERPL-MCNC: 9.4 MG/DL — SIGNIFICANT CHANGE UP (ref 8.4–10.5)
CHLORIDE SERPL-SCNC: 105 MMOL/L — SIGNIFICANT CHANGE UP (ref 98–107)
CO2 SERPL-SCNC: 24 MMOL/L — SIGNIFICANT CHANGE UP (ref 22–31)
CREAT SERPL-MCNC: 1.44 MG/DL — HIGH (ref 0.5–1.3)
EGFR: 33 ML/MIN/1.73M2 — LOW
EOSINOPHIL # BLD AUTO: 0.19 K/UL — SIGNIFICANT CHANGE UP (ref 0–0.5)
EOSINOPHIL NFR BLD AUTO: 2.5 % — SIGNIFICANT CHANGE UP (ref 0–6)
GLUCOSE SERPL-MCNC: 153 MG/DL — HIGH (ref 70–99)
HCT VFR BLD CALC: 34.4 % — LOW (ref 34.5–45)
HGB BLD-MCNC: 11.4 G/DL — LOW (ref 11.5–15.5)
IANC: 5.33 K/UL — SIGNIFICANT CHANGE UP (ref 1.8–7.4)
IMM GRANULOCYTES NFR BLD AUTO: 0.3 % — SIGNIFICANT CHANGE UP (ref 0–0.9)
LYMPHOCYTES # BLD AUTO: 1.14 K/UL — SIGNIFICANT CHANGE UP (ref 1–3.3)
LYMPHOCYTES # BLD AUTO: 14.9 % — SIGNIFICANT CHANGE UP (ref 13–44)
MCHC RBC-ENTMCNC: 29.5 PG — SIGNIFICANT CHANGE UP (ref 27–34)
MCHC RBC-ENTMCNC: 33.1 GM/DL — SIGNIFICANT CHANGE UP (ref 32–36)
MCV RBC AUTO: 89.1 FL — SIGNIFICANT CHANGE UP (ref 80–100)
MONOCYTES # BLD AUTO: 0.91 K/UL — HIGH (ref 0–0.9)
MONOCYTES NFR BLD AUTO: 11.9 % — SIGNIFICANT CHANGE UP (ref 2–14)
NEUTROPHILS # BLD AUTO: 5.33 K/UL — SIGNIFICANT CHANGE UP (ref 1.8–7.4)
NEUTROPHILS NFR BLD AUTO: 69.7 % — SIGNIFICANT CHANGE UP (ref 43–77)
NRBC # BLD: 0 /100 WBCS — SIGNIFICANT CHANGE UP (ref 0–0)
NRBC # FLD: 0 K/UL — SIGNIFICANT CHANGE UP (ref 0–0)
PLATELET # BLD AUTO: 164 K/UL — SIGNIFICANT CHANGE UP (ref 150–400)
POTASSIUM SERPL-MCNC: 3.3 MMOL/L — LOW (ref 3.5–5.3)
POTASSIUM SERPL-SCNC: 3.3 MMOL/L — LOW (ref 3.5–5.3)
PROT SERPL-MCNC: 6.7 G/DL — SIGNIFICANT CHANGE UP (ref 6–8.3)
RBC # BLD: 3.86 M/UL — SIGNIFICANT CHANGE UP (ref 3.8–5.2)
RBC # FLD: 12.7 % — SIGNIFICANT CHANGE UP (ref 10.3–14.5)
SODIUM SERPL-SCNC: 140 MMOL/L — SIGNIFICANT CHANGE UP (ref 135–145)
WBC # BLD: 7.64 K/UL — SIGNIFICANT CHANGE UP (ref 3.8–10.5)
WBC # FLD AUTO: 7.64 K/UL — SIGNIFICANT CHANGE UP (ref 3.8–10.5)

## 2023-10-04 PROCEDURE — 93970 EXTREMITY STUDY: CPT | Mod: 26

## 2023-10-04 PROCEDURE — 73620 X-RAY EXAM OF FOOT: CPT | Mod: 26,LT

## 2023-10-04 PROCEDURE — 73590 X-RAY EXAM OF LOWER LEG: CPT | Mod: 26,LT

## 2023-10-04 PROCEDURE — 73610 X-RAY EXAM OF ANKLE: CPT | Mod: 26,LT

## 2023-10-04 PROCEDURE — 99285 EMERGENCY DEPT VISIT HI MDM: CPT

## 2023-10-04 PROCEDURE — 93010 ELECTROCARDIOGRAM REPORT: CPT

## 2023-10-04 RX ORDER — ACETAMINOPHEN 500 MG
1000 TABLET ORAL ONCE
Refills: 0 | Status: COMPLETED | OUTPATIENT
Start: 2023-10-04 | End: 2023-10-04

## 2023-10-04 RX ORDER — COLCHICINE 0.6 MG
1 TABLET ORAL
Qty: 30 | Refills: 0
Start: 2023-10-04 | End: 2023-11-02

## 2023-10-04 RX ORDER — COLCHICINE 0.6 MG
0.6 TABLET ORAL ONCE
Refills: 0 | Status: COMPLETED | OUTPATIENT
Start: 2023-10-04 | End: 2023-10-04

## 2023-10-04 RX ADMIN — Medication 0.6 MILLIGRAM(S): at 17:36

## 2023-10-04 RX ADMIN — Medication 400 MILLIGRAM(S): at 11:00

## 2023-10-04 NOTE — ED PROVIDER NOTE - PROGRESS NOTE DETAILS
VASU Venegas:  ID #[id number] used to reassess pt. She states she feels better. She is able to ambulate but with difficulties as painful to bear weight on the left foot. Podiatry evaluated pt and most plausible diagnosis from their standpoint is gouty arthritis for which they recommend colchicine and outpatient follow up. Admission was offered for rehab due to difficulties ambulating. Family refused admission. We discussed with family including daughter and granddaughter Mily Gaitan who is an OT, she states pt has home care and great family support who can care for her at home, they are mindful of the risks of fall and subsequent risks. Patient also wants to go home, she says family can take care of her and she can walk with her walker and assistance. will dc home. Strict return precautions. VASU Venegas:  ID #[id number] used to reassess pt. She states she feels better. She is able to ambulate but with difficulties as painful to bear weight on the left foot. Podiatry evaluated pt and most plausible diagnosis from their standpoint is gouty arthritis for which they recommend colchicine and outpatient follow up. Admission was offered for rehab due to difficulties ambulating. Family refused admission. We discussed with family including daughter and granddaughter Mily Gaitan who is an OT, she states pt has home care and great family support who can care for her at home, they are mindful of the risks of fall and subsequent risks. Patient also wants to go home, she says family can take care of her and she can walk with her walker and assistance. will dc home. Strict return precautions. VASU Venegas:  ID #[id number] used to reassess pt. She states she feels better. She is able to ambulate but with difficulties as painful to bear weight on the left foot. Podiatry evaluated pt and most plausible diagnosis from their standpoint is gouty arthritis for which they recommend colchicine and outpatient follow up. Admission was offered for rehab due to difficulties ambulating. Family refused admission. We discussed with family including daughter and granddaughter Mily Gaitan who is an OT, she states pt has home care and great family support who can care for her at home, they are mindful of the risks of fall and subsequent risks. Patient also wants to go home, she says family can take care of her and she can walk with her walker and assistance. will dc home. Strict return precautions.

## 2023-10-04 NOTE — ED PROVIDER NOTE - OBJECTIVE STATEMENT
89-year-old female with past medical history of hyperlipidemia and hypertension presents to the ED complaining of left lower extremity pain for 2 days.  Per daughter patient usually ambulates with a walker, last few days has been unable to bear weight on the left foot completely, complains of excruciating pain with weightbearing.  Denies any fall or trauma.  Denies any fevers or chills.  Denies any cardiopulmonary symptoms, also denies any weakness numbness or tingling sensation. patient denies any other complaints. 89-year-old female with past medical history of hyperlipidemia and hypertension presents to the ED complaining of left lower extremity pain for 2 days.  Per daughter patient usually ambulates with a walker, last few days has been unable to bear weight on the left foot completely, complains of excruciating pain with weightbearing.  Denies any fall or trauma.  Denies any fevers or chills.  Denies any cardiopulmonary symptoms, also denies any weakness numbness or tingling sensation. patient denies any other complaints.  Attending - Agree with above.  I evaluated patient myself. 89-year-old female brought in by ambulance.  Noted past medical history including hypertension and prediabetes.  Complains of increased left ankle and foot pain times few days.  At baseline ambulates with a cane but never had this pain before.  No known trauma.  No fever.

## 2023-10-04 NOTE — CONSULT NOTE ADULT - SUBJECTIVE AND OBJECTIVE BOX
Patient is a 89y old  Female who presents with a chief complaint of     HPI:      PAST MEDICAL & SURGICAL HISTORY:  HTN (hypertension)      HLD (hyperlipidemia)      No significant past surgical history          MEDICATIONS  (STANDING):    MEDICATIONS  (PRN):      Allergies    No Known Allergies    Intolerances        VITALS:    Vital Signs Last 24 Hrs  T(C): 36.7 (04 Oct 2023 12:03), Max: 36.7 (04 Oct 2023 09:35)  T(F): 98.1 (04 Oct 2023 12:03), Max: 98.1 (04 Oct 2023 09:35)  HR: 78 (04 Oct 2023 12:03) (77 - 82)  BP: 126/62 (04 Oct 2023 12:03) (110/38 - 142/54)  BP(mean): --  RR: 25 (04 Oct 2023 12:03) (16 - 25)  SpO2: 99% (04 Oct 2023 12:03) (99% - 99%)    Parameters below as of 04 Oct 2023 11:11  Patient On (Oxygen Delivery Method): room air        LABS:                          11.4   7.64  )-----------( 164      ( 04 Oct 2023 11:22 )             34.4       10-04    140  |  105  |  40<H>  ----------------------------<  153<H>  3.3<L>   |  24  |  1.44<H>    Ca    9.4      04 Oct 2023 11:22    TPro  6.7  /  Alb  3.5  /  TBili  1.2  /  DBili  x   /  AST  57<H>  /  ALT  45<H>  /  AlkPhos  68  10-04      CAPILLARY BLOOD GLUCOSE              LOWER EXTREMITY PHYSICAL EXAM:    Vascular: DP/PT 2/4, B/L, CFT <3 seconds B/L, Temperature gradient warm to warm, B/L.   Neuro: Epicritic sensation intact to the level of digits, B/L.  Musculoskeletal/Ortho: moderate pain on palpation of left foot. moderate pain on ROM. muscle strencgh 2/5 2/2 pain guarding   Skin: b/l no open wounds, no signs of infection.     RADIOLOGY & ADDITIONAL STUDIES:    < from: Xray Foot AP + Lateral, Left (10.04.23 @ 11:57) >    ACC: [id number] EXAM:  XR TIB FIB AP LAT 2 VIEWS LT   ORDERED BY: CONSTANTIN NEWELL     ACC: [id number] EXAM:  XR FOOT 2 VIEWS LT   ORDERED BY: CONSTANTIN NEWELL     ACC: [id number] EXAM:  XR ANKLE COMP MIN 3 VIEWS LT   ORDERED BY: CONSTANTIN NEWELL     PROCEDURE DATE:  10/04/2023          INTERPRETATION:  CLINICAL INDICATION: distal left lower extremity pain    EXAM:  AP lateral left leg and frontal oblique and lateral left ankle and foot   from 10/4/2023 at 1157. No similar prior studies available for comparison.    IMPRESSION:  No tracking soft tissue gas collections or radiopaque foreign bodies.    No fractures, dislocations, or osseous or joint deformities.    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 Tarsometatarsal   alignment maintained without evidence for a Lisfranc injury.    Congenitally fused 5th DIP joint. Slightly hypertrophic 1st metatarsal   head dorsomedial eminence with small bunion. Preserved knee, ankle, and   foot joint spaces and no joint margin erosions.    Small diminutive bipartite medial hallux sesamoid. Small calcaneal   enthesophytes.    Generalized osteopenia otherwise no discrete lytic or blastic lesions.    Vascular calcifications.    --- End of Report ---            THADDEUS CERVANTES MD; Attending Radiologist  This document has been electronically signed. Oct  4 2023 12:06PM    < end of copied text >   Patient is a 89y old  Female who presents with a chief complaint of     HPI:      PAST MEDICAL & SURGICAL HISTORY:  HTN (hypertension)      HLD (hyperlipidemia)      No significant past surgical history          MEDICATIONS  (STANDING):    MEDICATIONS  (PRN):      Allergies    No Known Allergies    Intolerances        VITALS:    Vital Signs Last 24 Hrs  T(C): 36.7 (04 Oct 2023 12:03), Max: 36.7 (04 Oct 2023 09:35)  T(F): 98.1 (04 Oct 2023 12:03), Max: 98.1 (04 Oct 2023 09:35)  HR: 78 (04 Oct 2023 12:03) (77 - 82)  BP: 126/62 (04 Oct 2023 12:03) (110/38 - 142/54)  BP(mean): --  RR: 25 (04 Oct 2023 12:03) (16 - 25)  SpO2: 99% (04 Oct 2023 12:03) (99% - 99%)    Parameters below as of 04 Oct 2023 11:11  Patient On (Oxygen Delivery Method): room air        LABS:                          11.4   7.64  )-----------( 164      ( 04 Oct 2023 11:22 )             34.4       10-04    140  |  105  |  40<H>  ----------------------------<  153<H>  3.3<L>   |  24  |  1.44<H>    Ca    9.4      04 Oct 2023 11:22    TPro  6.7  /  Alb  3.5  /  TBili  1.2  /  DBili  x   /  AST  57<H>  /  ALT  45<H>  /  AlkPhos  68  10-04      CAPILLARY BLOOD GLUCOSE              LOWER EXTREMITY PHYSICAL EXAM:    Vascular: DP/PT 2/4, B/L, CFT <3 seconds B/L, Temperature gradient warm to warm, B/L.   Neuro: Epicritic sensation intact to the level of digits, B/L.  Musculoskeletal/Ortho: moderate pain on palpation of left foot. moderate pain on ROM. muscle strencgh 2/5 2/2 pain guarding   Skin: b/l no open wounds, no signs of infection.     RADIOLOGY & ADDITIONAL STUDIES:    < from: Xray Foot AP + Lateral, Left (10.04.23 @ 11:57) >    ACC: [id number] EXAM:  XR TIB FIB AP LAT 2 VIEWS LT   ORDERED BY: CONSTANTIN NEWELL     ACC: [id number] EXAM:  XR FOOT 2 VIEWS LT   ORDERED BY: CONSTANTIN NEWELL     ACC: [id number] EXAM:  XR ANKLE COMP MIN 3 VIEWS LT   ORDERED BY: CONSTANTIN NEWELL     PROCEDURE DATE:  10/04/2023          INTERPRETATION:  CLINICAL INDICATION: distal left lower extremity pain    EXAM:  AP lateral left leg and frontal oblique and lateral left ankle and foot   from 10/4/2023 at 1157. No similar prior studies available for comparison.    IMPRESSION:  No tracking soft tissue gas collections or radiopaque foreign bodies.    No fractures, dislocations, or osseous or joint deformities.    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 Tarsometatarsal   alignment maintained without evidence for a Lisfranc injury.    Congenitally fused 5th DIP joint. Slightly hypertrophic 1st metatarsal   head dorsomedial eminence with small bunion. Preserved knee, ankle, and   foot joint spaces and no joint margin erosions.    Small diminutive bipartite medial hallux sesamoid. Small calcaneal   enthesophytes.    Generalized osteopenia otherwise no discrete lytic or blastic lesions.    Vascular calcifications.    --- End of Report ---            THADDEUS CERVANTES MD; Attending Radiologist  This document has been electronically signed. Oct  4 2023 12:06PM    < end of copied text >   Patient is a 89y old  Female who presents with a chief complaint of     HPI:      PAST MEDICAL & SURGICAL HISTORY:  HTN (hypertension)      HLD (hyperlipidemia)      No significant past surgical history          MEDICATIONS  (STANDING):    MEDICATIONS  (PRN):      Allergies    No Known Allergies    Intolerances        VITALS:    Vital Signs Last 24 Hrs  T(C): 36.7 (04 Oct 2023 12:03), Max: 36.7 (04 Oct 2023 09:35)  T(F): 98.1 (04 Oct 2023 12:03), Max: 98.1 (04 Oct 2023 09:35)  HR: 78 (04 Oct 2023 12:03) (77 - 82)  BP: 126/62 (04 Oct 2023 12:03) (110/38 - 142/54)  BP(mean): --  RR: 25 (04 Oct 2023 12:03) (16 - 25)  SpO2: 99% (04 Oct 2023 12:03) (99% - 99%)    Parameters below as of 04 Oct 2023 11:11  Patient On (Oxygen Delivery Method): room air        LABS:                          11.4   7.64  )-----------( 164      ( 04 Oct 2023 11:22 )             34.4       10-04    140  |  105  |  40<H>  ----------------------------<  153<H>  3.3<L>   |  24  |  1.44<H>    Ca    9.4      04 Oct 2023 11:22    TPro  6.7  /  Alb  3.5  /  TBili  1.2  /  DBili  x   /  AST  57<H>  /  ALT  45<H>  /  AlkPhos  68  10-04      CAPILLARY BLOOD GLUCOSE              LOWER EXTREMITY PHYSICAL EXAM:    Vascular: DP/PT 2/4, B/L, CFT <3 seconds B/L, Temperature gradient warm to warm, B/L.   Neuro: Epicritic sensation intact to the level of digits, B/L.  Musculoskeletal/Ortho: moderate pain on palpation of left foot. moderate pain on ROM. muscle strencgh 2/5 2/2 pain guarding   Skin: b/l no open wounds, no signs of infection.     RADIOLOGY & ADDITIONAL STUDIES:    < from: Xray Foot AP + Lateral, Left (10.04.23 @ 11:57) >    ACC: [id number] EXAM:  XR TIB FIB AP LAT 2 VIEWS LT   ORDERED BY: CONSTANTIN NEWELL     ACC: [id number] EXAM:  XR FOOT 2 VIEWS LT   ORDERED BY: CONSTANTIN NEWELL     ACC: [id number] EXAM:  XR ANKLE COMP MIN 3 VIEWS LT   ORDERED BY: CONSTANTIN NEWELL     PROCEDURE DATE:  10/04/2023          INTERPRETATION:  CLINICAL INDICATION: distal left lower extremity pain    EXAM:  AP lateral left leg and frontal oblique and lateral left ankle and foot   from 10/4/2023 at 1157. No similar prior studies available for comparison.    IMPRESSION:  No tracking soft tissue gas collections or radiopaque foreign bodies.    No fractures, dislocations, or osseous or joint deformities.    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 Tarsometatarsal   alignment maintained without evidence for a Lisfranc injury.    Congenitally fused 5th DIP joint. Slightly hypertrophic 1st metatarsal   head dorsomedial eminence with small bunion. Preserved knee, ankle, and   foot joint spaces and no joint margin erosions.    Small diminutive bipartite medial hallux sesamoid. Small calcaneal   enthesophytes.    Generalized osteopenia otherwise no discrete lytic or blastic lesions.    Vascular calcifications.    --- End of Report ---            THADDEUS CERVANTES MD; Attending Radiologist  This document has been electronically signed. Oct  4 2023 12:06PM    < end of copied text >

## 2023-10-04 NOTE — ED ADULT NURSE NOTE - NSFALLHARMRISKINTERV_ED_ALL_ED
Assistance OOB with selected safe patient handling equipment if applicable/Assistance with ambulation/Communicate risk of Fall with Harm to all staff, patient, and family/Monitor gait and stability/Provide visual cue: red socks, yellow wristband, yellow gown, etc/Reinforce activity limits and safety measures with patient and family/Bed in lowest position, wheels locked, appropriate side rails in place/Call bell, personal items and telephone in reach/Instruct patient to call for assistance before getting out of bed/chair/stretcher/Non-slip footwear applied when patient is off stretcher/[place] to call system/Physically safe environment - no spills, clutter or unnecessary equipment/Purposeful Proactive Rounding/Room/bathroom lighting operational, light cord in reach Assistance OOB with selected safe patient handling equipment if applicable/Assistance with ambulation/Communicate risk of Fall with Harm to all staff, patient, and family/Monitor gait and stability/Provide visual cue: red socks, yellow wristband, yellow gown, etc/Reinforce activity limits and safety measures with patient and family/Bed in lowest position, wheels locked, appropriate side rails in place/Call bell, personal items and telephone in reach/Instruct patient to call for assistance before getting out of bed/chair/stretcher/Non-slip footwear applied when patient is off stretcher/[place] to call system/Physically safe environment - no spills, clutter or unnecessary equipment/Purposeful Proactive Rounding/Room/bathroom lighting operational, light cord in reach Assistance OOB with selected safe patient handling equipment if applicable/Assistance with ambulation/Communicate risk of Fall with Harm to all staff, patient, and family/Monitor gait and stability/Provide visual cue: red socks, yellow wristband, yellow gown, etc/Reinforce activity limits and safety measures with patient and family/Bed in lowest position, wheels locked, appropriate side rails in place/Call bell, personal items and telephone in reach/Instruct patient to call for assistance before getting out of bed/chair/stretcher/Non-slip footwear applied when patient is off stretcher/[place] to call system/Physically safe environment - no spills, clutter or unnecessary equipment/Purposeful Proactive Rounding/Room/bathroom lighting operational, light cord in reach

## 2023-10-04 NOTE — ED PROVIDER NOTE - PHYSICAL EXAMINATION
ATTENDING PHYSICAL EXAM  GEN - NAD; well appearing; A+O x3  HEAD - NC/AT; EYES/NOSE - PERRL, EOMI, mucous membranes moist, no discharge; THROAT: Oral cavity and pharynx normal. No inflammation, swelling, exudate, or lesions  NECK: Neck supple, non-tender without lymphadenopathy, no masses, no JVD  PULMONARY - CTA b/l, symmetric breath sounds, no w/r/r  CARDIAC -s1s2, RRR, no M,R,G  ABDOMEN - +NABS, ND, NT, soft, no guarding, no rebound, no masses   BACK - no CVA tenderness, No vertebral or paravertebral tenderness  EXTREMITIES -  Left hip and knee exam normal.  Neg Homans sign.  2+ DP/PT pulses.  Left ankle with mild swelling and pain with passive movement.  Left foot with tenderness on dorsal aspect.  No erythema, warmth, or other signs of cellulitis.  Distal PMS intact.

## 2023-10-04 NOTE — ED PROVIDER NOTE - TELEPHONIC ID NUMBER OF THE INTERPRETER
[id number] Consent (Nose)/Introductory Paragraph: The rationale for Mohs was explained to the patient and consent was obtained. The risks, benefits and alternatives to therapy were discussed in detail. Specifically, the risks of nasal deformity, changes in the flow of air through the nose, infection, scarring, bleeding, prolonged wound healing, incomplete removal, allergy to anesthesia, nerve injury and recurrence were addressed. Prior to the procedure, the treatment site was clearly identified and confirmed by the patient. All components of Universal Protocol/PAUSE Rule completed.

## 2023-10-04 NOTE — ED PROVIDER NOTE - NSFOLLOWUPINSTRUCTIONS_ED_ALL_ED_FT
Please take the following medications as prescribed:  - Colchicine 0.6mg a day  - Tylenol 650mg every 6 hours    Please call [phone] to make a Podiatry appointment       Follow up with your PMD within 1-2 days or you can call our clinic at [phone] for an appointment  Take all of your other medications as previously prescribed.  Worsening, continued or ANY new concerning symptoms return to the Emergency Department. Please take the following medications as prescribed:  - Colchicine 0.6mg a day  - Tylenol 650mg every 6 hours    Please call [phone] to make a Podiatry appointment       Follow up with your PMD within 1-2 days or you can call our clinic at [phone] for an appointment  Take all of your other medications as previously prescribed.  Worsening, continued or ANY new concerning symptoms return to the Emergency Department. Please take the following medications as prescribed:  - Colchicine 0.6mg a day  - Tylenol 650mg every 6 hours    Please call [phone] to make a Podiatry appointment       Follow up with your PMD within 1-2 days or you can call our clinic at [phone] for an appointment  Take all of your other medications as previously prescribed.  Worsening, continued or ANY new concerning symptoms return to the Emergency Department.

## 2023-10-04 NOTE — ED ADULT TRIAGE NOTE - NS ED NURSE AMBULANCES
[facility]

## 2023-10-04 NOTE — ED PROVIDER NOTE - PATIENT PORTAL LINK FT
You can access the FollowMyHealth Patient Portal offered by [facility] by registering at the following website: [email or web address]. By joining [email or web address]’s FollowMyHealth portal, you will also be able to view your health information using other applications (apps) compatible with our system. You can access the FollowMyHealth Patient Portal offered by [facility] by registering at the following website: [email or web address]. By joining [employer]’s FollowMyHealth portal, you will also be able to view your health information using other applications (apps) compatible with our system. You can access the FollowMyHealth Patient Portal offered by [facility] by registering at the following website: [email or web address]. By joining [employer]’s FollowMyHealth portal, you will also be able to view your health information using other applications (apps) compatible with our system.

## 2023-10-04 NOTE — ED PROVIDER NOTE - CLINICAL SUMMARY MEDICAL DECISION MAKING FREE TEXT BOX
89-year-old female with past medical history of hyperlipidemia and hypertension presents to the ED complaining of left lower extremity pain for 2 days. Hemodynamically stable, in no acute distress.  On exam, bilateral lower extremity no gross bony deformity, dorsalis pedis pulses palpable bilaterally, full range of motion of the left ankle is limited, there is a small area of erythema to the dorsal aspect of the midfoot decreased strength left lower extremity.  Imp: Left lower extremity pain, concern for DVT versus infectious.  Plan for labs including ESR and CRP, duplex, chest x-ray, x-rays, analgesics.  Reassess.

## 2023-10-04 NOTE — ED PROVIDER NOTE - NS ED ATTENDING STATEMENT MOD
This was a shared visit with the CHAUNCEY. I reviewed and verified the documentation and independently performed the documented:

## 2023-10-04 NOTE — CONSULT NOTE ADULT - ASSESSMENT
89F presents with worsening left foot pain, denies trauma  - pt seen and evaluated   - afebrile, no leukocytosis, UA: 8.5  - NVS intact  - LFXR: no fx. no dislocation, no osseous deformity, no OM   - moderate pain on palpation of left foot. moderate pain on ROM of ankle, muscle strencgh 2/5 2/2 pain guarding. b/l no open wounds, no signs of infection.  - rec colchicine  - wrapped with gentle compressive ACE bandage   - stable for d/c from podiatry to f/u within 1 week with Dr. Joe Waterhouse. please call [phone] to make an appointment   - discussed with attending   89F presents with worsening left foot pain, denies trauma  - pt seen and evaluated   - afebrile, no leukocytosis, UA: 8.5  - NVS intact  - LFXR: no fx. no dislocation, no osseous deformity, no OM   - moderate pain on palpation of left foot. moderate pain on ROM of ankle, muscle strencgh 2/5 2/2 pain guarding. b/l no open wounds, no signs of infection.  - rec colchicine  - wrapped with gentle compressive ACE bandage   - stable for d/c from podiatry to f/u within 1 week with Dr. Joe Waterhouse. please call [phone] to make an appointment   - discussed with attending   89F presents with worsening left foot pain, denies trauma  - pt seen and evaluated   - afebrile, no leukocytosis, UA: 8.5  - NVS intact  - LFXR: no fx. no dislocation, no osseous deformity, no OM   - moderate pain on palpation of left foot. moderate pain on ROM of ankle, muscle strencgh 2/5 2/2 pain guarding. b/l no open wounds, no signs of infection.  - rec colchicine  - wrapped with gentle compressive ACE bandage   - stable for d/c from podiatry to f/u within 1 week with Dr. Joe Waterhouse. please call [phone] to make an appointment   - discussed with attending

## 2024-01-01 PROBLEM — R73.03 PREDIABETES: Chronic | Status: ACTIVE | Noted: 2023-03-01

## 2024-01-01 PROBLEM — I10 ESSENTIAL (PRIMARY) HYPERTENSION: Chronic | Status: ACTIVE | Noted: 2023-03-01

## 2024-01-01 RX ORDER — HYDROCHLOROTHIAZIDE 25 MG
1 TABLET ORAL
Refills: 0 | DISCHARGE

## 2024-01-01 RX ORDER — FOSINOPRIL SODIUM 10 MG/1
1 TABLET ORAL
Refills: 0 | DISCHARGE

## 2024-01-01 RX ORDER — CALCIUM CARBONATE 500(1250)
0 TABLET ORAL
Refills: 0 | DISCHARGE

## 2024-01-01 RX ORDER — LABETALOL HCL 100 MG
1 TABLET ORAL
Refills: 0 | DISCHARGE

## 2024-06-10 NOTE — DISCHARGE NOTE NURSING/CASE MANAGEMENT/SOCIAL WORK - NSDCPETBCESMAN_GEN_ALL_CORE
----- Message from Dragan Cohen MD sent at 6/7/2024  2:23 PM CDT -----  Please notify patient ultrasound shows normal liver.   If you are a smoker, it is important for your health to stop smoking. Please be aware that second hand smoke is also harmful.

## 2024-12-27 NOTE — ED PROVIDER NOTE - NS ED ROS FT
Chante Ramirez accepts patient. Room # 120 RN to give  #report [phone]    [facility] called for BLS ambulance. ETA: 3:50PM  +Isolation     PSC form completed. Risa GÓMEZ, 12/27/24, 2:14 PM       Please see HPI & MDM

## 2025-02-10 NOTE — PROGRESS NOTE ADULT - PROBLEM SELECTOR PROBLEM 1
Patient Quality Outreach    Patient is due for the following:   Colon Cancer Screening      Topic Date Due    Pneumococcal Vaccine (1 of 2 - PCV) Never done    Zoster (Shingles) Vaccine (1 of 2) Never done    Hepatitis B Vaccine (1 of 3 - Risk 3-dose series) Never done    Flu Vaccine (1) 09/01/2024    COVID-19 Vaccine (3 - 2024-25 season) 09/01/2024       Type of outreach:    Sent [employer] message.    Questions for provider review:    None           Domitila Barnard  Chart routed to Care Team.    
Patient Quality Outreach    Patient is due for the following:   Colon Cancer Screening      Topic Date Due    Pneumococcal Vaccine (1 of 2 - PCV) Never done    Zoster (Shingles) Vaccine (1 of 2) Never done    Hepatitis B Vaccine (1 of 3 - Risk 3-dose series) Never done    Flu Vaccine (1) 09/01/2024    COVID-19 Vaccine (3 - 2024-25 season) 09/01/2024       Type of outreach:    Sent letter.    Questions for provider review:    None           Domitila Barnard CMA  Chart routed to Care Team.      
NSTEMI (non-ST elevation myocardial infarction)
NSTEMI (non-ST elevation myocardial infarction)

## 2025-04-30 NOTE — ED PROVIDER NOTE - DATE/TIME 1
Advance Care Planning   Healthcare Decision Maker:    Primary Decision Maker: Mary Jane Sevilla - [facility] - [phone]    Click here to complete Healthcare Decision Makers including selection of the Healthcare Decision Maker Relationship (ie \"Primary\").  Today we documented Decision Maker(s) consistent with Legal Next of Kin hierarchy.           
13-Jul-2023 21:55

## 2025-05-07 NOTE — PHYSICAL THERAPY INITIAL EVALUATION ADULT - STANDING BALANCE: STATIC
Physical Therapy  Cash Based Dry Needling Session    Visit: 48  Dry Needling Informed Consent Signed: Yes  Patient has been made aware of the cash based cost associated with each of the above procedures: Yes    SUBJECTIVE   Current Pain: not rated, but increased soreness from working in the yard       OBJECTIVE    Palpation:  Tenderness and restrictions right gluteals and piriformis     Treatment   Education about indications, contraindications and potential side effects completed with patient.    Screen Completed    - Precautions: local skin lesions, lyme disease, local lymphedema, severe hyperalgesia/allodynia, metal allergies: nickel and chromium, abnormal bleeding tendency, immunodeficiency and/or compromised immune system, second or third trimester of pregnancy, vascular disease, history of spontaneous pneumothorax   - Contraindications: local or systemic infections including the flu, over implants, active cancer, area of lymphatic compromise, area of lumpectomy/mastectomy, first trimester of pregnancy    Patient Education  Review of last treatment, symptoms following treatment, and changes noted   Review dry needling expectations of treatment and rationale of use     Reinforced avoiding exercise for the rest of the day and vigorous movement or lifting to minimize soreness potential.      Universal Protocol  - Time Out performed with patient verifying procedure and consent prior to performing the procedure.  - Sign Out performed with patient verifying procedure performed and addressing specimens if applicable upon completion of the procedure.        Dry Needling:  Needles Inserted: 9     Needles Removed: 9    Locations and Needle Sizes:  right   1. Glut med middle: 60 mm  2. Glut med posterior: 60 mm  3. Glut med anterior: 60 mm  4  Glut min anterior 60 mm  5. Glut min posterior: 60 mm  6. Glut min middle: 60 mm  7-8. Piriformis: 60 mm  9. Glut Med middle: 60 mm    Time Out performed at 11:18, with patient  verifying procedure and consent prior to performing the procedure.    Sign Out performed at 11:35, with patient verifying procedure performed and addressing specimens if applicable upon completion of the procedure.          Advised patient to monitor symptoms as day progresses, and to pay attention to changes in function or pain patterns.  Advised ok to apply heat/ice as necessary to treat any soreness present in area.      ASSESSMENT AND FUTURE RECOMMENDATIONS    Response to treatment: Multiple twitches and palpable improvement in restrictions noted.  Mild soreness, but improved ease of movement   Re-assessment of dysfunctions following intervention demonstrates: improved ease of mobility     Based on the above recommendation is to: Continue Cash Based Service      Therapy procedure time and total treatment time can be found documented on the Time Entry flowsheet   with rollator/good minus

## 2025-06-16 NOTE — PROGRESS NOTE ADULT - PROBLEM SELECTOR PROBLEM 6
06-16    140  |  102  |  56[H]  ----------------------------<  136[H]  4.7   |  23  |  2.3[H]    Ca    9.4      16 Jun 2025 06:28  Phos  4.6     06-15  Mg     2.8     06-16    TPro  6.5  /  Alb  3.5  /  TBili  0.4  /  DBili  x   /  AST  18  /  ALT  11  /  AlkPhos  100  06-16  POCT Blood Glucose.: 201 mg/dL (06-16-25 @ 13:01)  A1C with Estimated Average Glucose Result: 8.8 % (06-14-25 @ 07:14)  A1C with Estimated Average Glucose Result: 9.7 % (04-19-25 @ 11:44)  A1C with Estimated Average Glucose Result: 9.7 % (03-30-25 @ 06:05)                          13.4   33.02 )-----------( 335      ( 16 Jun 2025 06:28 )             44.5     
Need for prophylactic measure
Need for prophylactic measure